# Patient Record
Sex: MALE | Race: BLACK OR AFRICAN AMERICAN | NOT HISPANIC OR LATINO | Employment: FULL TIME
[De-identification: names, ages, dates, MRNs, and addresses within clinical notes are randomized per-mention and may not be internally consistent; named-entity substitution may affect disease eponyms.]

---

## 2020-10-09 ENCOUNTER — TRANSCRIBE ORDERS (OUTPATIENT)
Dept: OTHER | Age: 40
End: 2020-10-09

## 2020-10-09 DIAGNOSIS — G57.93 NEUROPATHIC PAIN OF BOTH FEET: Primary | ICD-10-CM

## 2020-10-19 ENCOUNTER — OFFICE VISIT (OUTPATIENT)
Dept: NEUROLOGY | Facility: CLINIC | Age: 40
End: 2020-10-19
Attending: INTERNAL MEDICINE
Payer: COMMERCIAL

## 2020-10-19 DIAGNOSIS — R20.2 NUMBNESS AND TINGLING OF BOTH LEGS: Primary | ICD-10-CM

## 2020-10-19 DIAGNOSIS — R20.0 NUMBNESS AND TINGLING OF BOTH LEGS: Primary | ICD-10-CM

## 2020-10-19 PROCEDURE — 95885 MUSC TST DONE W/NERV TST LIM: CPT | Performed by: INTERNAL MEDICINE

## 2020-10-19 PROCEDURE — 95910 NRV CNDJ TEST 7-8 STUDIES: CPT | Performed by: INTERNAL MEDICINE

## 2020-10-19 NOTE — LETTER
10/19/2020       RE: Karla Quiñones  [address]  [facility] [address]     Dear Colleague,    Thank you for referring your patient, Karla Quiñones, to the [facility] [place] at [facility]. Please see a copy of my visit note below.        [facility]  Electrodiagnostic Laboratory      Nerve Conduction & EMG Report          Patient:       Karla Quiñones  Patient ID:    [id number]  Gender:        Male  YOB: 1980  Age:           40 Years 8 Months        History & Examination:  Patient is a 41 y/o male who presents with tingling/burning sensation in the lower extremities. Symptoms are symmetric and present below the knees. He denies any symptoms in the hands. He denies any low back pain that radiates down the legs. Examination shows 5/5 strength in the lower extremities and 1+ reflexes.     Techniques: Motor conduction studies were done with surface recording electrodes. Sensory conduction studies were performed with surface electrodes, unless indicated otherwise by (n), designating the use of subdermal recording electrodes. Temperature was monitored and recorded throughout the study. Upper extremities were maintained at a temperature of 32 degrees Centigrade or higher.  Lower extremities were maintained at a temperature of 31degrees Centigrade or higher. EMG was done with a concentric needle electrode.     Results:  Sensory NCS  - Right radial antidromic sensory nerve action potential (SNAP) showed normal peak latency, amplitude, and conduction velocity.  - Bilateral sural SNAPs showed normal peak latency, amplitude, and conduction velocity.  - Right superficial peroneal SNAP showed normal peak latency, amplitude, and conduction velocity.    Motor NCS  - Bilateral deep peroneal - EDB orthodromic compound muscle action potentials (CMAPs) showed normal distal latency, amplitude and conduction velocity.  - Bilateral tibial - AH  CMAPs showed normal distal latency, amplitude and conduction velocity.    EMG  Needle EMG of selected proximal and distal right lower extremity muscles was performed as tabulated below. No abnormal spontaneous activity was observed in the sampled muscles. Motor unit potential morphology and recruitment patterns were normal.     Interpretation:  This is a normal study. In particular, there is no electrophysiologic evidence of a large fiber polyneuropathy. In addition there is no evidence of a lumbar motor radiculopathy.     Of note this study is unable to evaluate for the presence of small fiber polyneuropathy and clinical correlation is advised.     EMG Physician:  Eliazar Beltran MD   of Neurology  [facility]      Sensory NCS      Nerve / Sites Rec. Site Onset Peak Ref. NP Amp Ref. PP Amp Dist Jose Carlos Ref. Temp     ms ms ms  V  V  V cm m/s m/s  C   R RADIAL - Snuff      Forearm Snuff 1.51 2.03  29.4 15.0 45.5 10 66.2 48.0 32.5      Forearm Snuff 1.61 2.03 3.00 27.8  43.9 10 61.9  32.4   R SURAL - Lat Mall      Calf Ankle 3.13 4.01  10.7 8.0 12.6 14 44.8 38.0 31.4   L SURAL - Lat Mall      Calf Ankle 3.49 4.27  10.1 8.0 10.3 14 40.1 38.0 31.1   R SUP PERONEAL      Lat Leg Garcia 2.34 3.39  5.8  7.1 12.5 53.3 38.0 32.5       Motor NCS      Nerve / Sites Rec. Site Lat Ref. Amp Ref. Rel Amp Dist Jose Carlos Ref. Dur. Area Temp.     ms ms mV mV % cm m/s m/s ms %  C   R DEEP PERONEAL - EDB      Ankle EDB 3.96 6.00 12.2 2.5 100 8   5.36 100 31.4      FibHead EDB 10.63  11.9  97.6 34 51.0 38.0 5.68 106 31.4      Pop Fos EDB 13.02  11.3  93 10.5 43.8 38.0 5.73 95 31.2   L DEEP PERONEAL - EDB      Ankle EDB 4.32 6.00 10.4 2.5 100 8   5.05 100 31.4   R TIBIAL - AH      Ankle AH 4.22 6.00 8.1 4.0 100 8   6.98 100 31.5      Pop Fos AH 13.75  7.3  89.7 45 47.2 38.0 8.18 93.5 31.4   L TIBIAL - AH      Ankle AH 4.58 6.00 11.8 4.0 100 8   6.51 100 31.4       EMG Summary Table     Spontaneous Recruitment MUAP    IA  Fib PSW Fasc H.F. Pattern Amp Dur. PPP   R. TIB ANTERIOR N None None None None N N N N   R. GASTROCN (MED) N None None None None N N N N   R. VAST MEDIALIS N None None None None N N N N   R. ILIOPSOAS N None None None None N N N N                              Again, thank you for allowing me to participate in the care of your patient.      Sincerely,    Eliazar Beltran MD

## 2020-10-19 NOTE — PROGRESS NOTES
[facility]  Electrodiagnostic Laboratory      Nerve Conduction & EMG Report          Patient:       Karla Quiñones  Patient ID:    [id number]  Gender:        Male  YOB: 1980  Age:           40 Years 8 Months        History & Examination:  Patient is a 39 y/o male who presents with tingling/burning sensation in the lower extremities. Symptoms are symmetric and present below the knees. He denies any symptoms in the hands. He denies any low back pain that radiates down the legs. Examination shows 5/5 strength in the lower extremities and 1+ reflexes.     Techniques: Motor conduction studies were done with surface recording electrodes. Sensory conduction studies were performed with surface electrodes, unless indicated otherwise by (n), designating the use of subdermal recording electrodes. Temperature was monitored and recorded throughout the study. Upper extremities were maintained at a temperature of 32 degrees Centigrade or higher.  Lower extremities were maintained at a temperature of 31degrees Centigrade or higher. EMG was done with a concentric needle electrode.     Results:  Sensory NCS  - Right radial antidromic sensory nerve action potential (SNAP) showed normal peak latency, amplitude, and conduction velocity.  - Bilateral sural SNAPs showed normal peak latency, amplitude, and conduction velocity.  - Right superficial peroneal SNAP showed normal peak latency, amplitude, and conduction velocity.    Motor NCS  - Bilateral deep peroneal - EDB orthodromic compound muscle action potentials (CMAPs) showed normal distal latency, amplitude and conduction velocity.  - Bilateral tibial - AH CMAPs showed normal distal latency, amplitude and conduction velocity.    EMG  Needle EMG of selected proximal and distal right lower extremity muscles was performed as tabulated below. No abnormal spontaneous activity was observed in the sampled muscles. Motor unit potential morphology and recruitment  patterns were normal.     Interpretation:  This is a normal study. In particular, there is no electrophysiologic evidence of a large fiber polyneuropathy. In addition there is no evidence of a lumbar motor radiculopathy.     Of note this study is unable to evaluate for the presence of small fiber polyneuropathy and clinical correlation is advised.     EMG Physician:  Eliazar Beltran MD   of Neurology  [facility]      Sensory NCS      Nerve / Sites Rec. Site Onset Peak Ref. NP Amp Ref. PP Amp Dist Jose Carlos Ref. Temp     ms ms ms  V  V  V cm m/s m/s  C   R RADIAL - Snuff      Forearm Snuff 1.51 2.03  29.4 15.0 45.5 10 66.2 48.0 32.5      Forearm Snuff 1.61 2.03 3.00 27.8  43.9 10 61.9  32.4   R SURAL - Lat Mall      Calf Ankle 3.13 4.01  10.7 8.0 12.6 14 44.8 38.0 31.4   L SURAL - Lat Mall      Calf Ankle 3.49 4.27  10.1 8.0 10.3 14 40.1 38.0 31.1   R SUP PERONEAL      Lat Leg Garcia 2.34 3.39  5.8  7.1 12.5 53.3 38.0 32.5       Motor NCS      Nerve / Sites Rec. Site Lat Ref. Amp Ref. Rel Amp Dist Jose Carlos Ref. Dur. Area Temp.     ms ms mV mV % cm m/s m/s ms %  C   R DEEP PERONEAL - EDB      Ankle EDB 3.96 6.00 12.2 2.5 100 8   5.36 100 31.4      FibHead EDB 10.63  11.9  97.6 34 51.0 38.0 5.68 106 31.4      Pop Fos EDB 13.02  11.3  93 10.5 43.8 38.0 5.73 95 31.2   L DEEP PERONEAL - EDB      Ankle EDB 4.32 6.00 10.4 2.5 100 8   5.05 100 31.4   R TIBIAL - AH      Ankle AH 4.22 6.00 8.1 4.0 100 8   6.98 100 31.5      Pop Fos AH 13.75  7.3  89.7 45 47.2 38.0 8.18 93.5 31.4   L TIBIAL - AH      Ankle AH 4.58 6.00 11.8 4.0 100 8   6.51 100 31.4       EMG Summary Table     Spontaneous Recruitment MUAP    IA Fib PSW Fasc H.F. Pattern Amp Dur. PPP   R. TIB ANTERIOR N None None None None N N N N   R. GASTROCN (MED) N None None None None N N N N   R. VAST MEDIALIS N None None None None N N N N   R. ILIOPSOAS N None None None None N N N N

## 2020-10-19 NOTE — LETTER
10/19/2020       RE: Karla Quiñones  [address]  [facility] [address]     Dear Colleague,    Thank you for referring your patient, Karla Quiñones, to the [facility] [place] at [facility]. Please see a copy of my visit note below.    Nerve Conduction & EMG Report      Patient:       Karla Quiñones  Patient ID:    [id number]  Gender:        Male  YOB: 1980  Age:           40 Years 8 Months        History & Examination:  Patient is a 41 y/o male who presents with tingling/burning sensation in the lower extremities. Symptoms are symmetric and present below the knees. He denies any symptoms in the hands. He denies any low back pain that radiates down the legs. Examination shows 5/5 strength in the lower extremities and 1+ reflexes.     Techniques: Motor conduction studies were done with surface recording electrodes. Sensory conduction studies were performed with surface electrodes, unless indicated otherwise by (n), designating the use of subdermal recording electrodes. Temperature was monitored and recorded throughout the study. Upper extremities were maintained at a temperature of 32 degrees Centigrade or higher.  Lower extremities were maintained at a temperature of 31degrees Centigrade or higher. EMG was done with a concentric needle electrode.     Results:  Sensory NCS  - Right radial antidromic sensory nerve action potential (SNAP) showed normal peak latency, amplitude, and conduction velocity.  - Bilateral sural SNAPs showed normal peak latency, amplitude, and conduction velocity.  - Right superficial peroneal SNAP showed normal peak latency, amplitude, and conduction velocity.    Motor NCS  - Bilateral deep peroneal - EDB orthodromic compound muscle action potentials (CMAPs) showed normal distal latency, amplitude and conduction velocity.  - Bilateral tibial - AH CMAPs showed normal distal latency, amplitude and conduction  velocity.    EMG  Needle EMG of selected proximal and distal right lower extremity muscles was performed as tabulated below. No abnormal spontaneous activity was observed in the sampled muscles. Motor unit potential morphology and recruitment patterns were normal.     Interpretation:  This is a normal study. In particular, there is no electrophysiologic evidence of a large fiber polyneuropathy. In addition there is no evidence of a lumbar motor radiculopathy.     Of note this study is unable to evaluate for the presence of small fiber polyneuropathy and clinical correlation is advised.     EMG Physician:  Eliazar Beltran MD   of Neurology  [facility]      Sensory NCS      Nerve / Sites Rec. Site Onset Peak Ref. NP Amp Ref. PP Amp Dist Jose Carlos Ref. Temp     ms ms ms  V  V  V cm m/s m/s  C   R RADIAL - Snuff      Forearm Snuff 1.51 2.03  29.4 15.0 45.5 10 66.2 48.0 32.5      Forearm Snuff 1.61 2.03 3.00 27.8  43.9 10 61.9  32.4   R SURAL - Lat Mall      Calf Ankle 3.13 4.01  10.7 8.0 12.6 14 44.8 38.0 31.4   L SURAL - Lat Mall      Calf Ankle 3.49 4.27  10.1 8.0 10.3 14 40.1 38.0 31.1   R SUP PERONEAL      Lat Leg Garcia 2.34 3.39  5.8  7.1 12.5 53.3 38.0 32.5       Motor NCS      Nerve / Sites Rec. Site Lat Ref. Amp Ref. Rel Amp Dist Jose Carlos Ref. Dur. Area Temp.     ms ms mV mV % cm m/s m/s ms %  C   R DEEP PERONEAL - EDB      Ankle EDB 3.96 6.00 12.2 2.5 100 8   5.36 100 31.4      FibHead EDB 10.63  11.9  97.6 34 51.0 38.0 5.68 106 31.4      Pop Fos EDB 13.02  11.3  93 10.5 43.8 38.0 5.73 95 31.2   L DEEP PERONEAL - EDB      Ankle EDB 4.32 6.00 10.4 2.5 100 8   5.05 100 31.4   R TIBIAL - AH      Ankle AH 4.22 6.00 8.1 4.0 100 8   6.98 100 31.5      Pop Fos AH 13.75  7.3  89.7 45 47.2 38.0 8.18 93.5 31.4   L TIBIAL - AH      Ankle AH 4.58 6.00 11.8 4.0 100 8   6.51 100 31.4       EMG Summary Table     Spontaneous Recruitment MUAP    IA Fib PSW Fasc H.F. Pattern Amp Dur. PPP   R. TIB ANTERIOR N  None None None None N N N N   R. GASTROCN (MED) N None None None None N N N N   R. VAST MEDIALIS N None None None None N N N N   R. ILIOPSOAS N None None None None N N N N                        Again, thank you for allowing me to participate in the care of your patient.  Sincerely,    Eliazar Beltran MD

## 2020-11-02 ENCOUNTER — COMMUNICATION - HEALTHEAST (OUTPATIENT)
Dept: SURGERY | Facility: CLINIC | Age: 40
End: 2020-11-02

## 2020-11-04 ENCOUNTER — COMMUNICATION - HEALTHEAST (OUTPATIENT)
Dept: SURGERY | Facility: CLINIC | Age: 40
End: 2020-11-04

## 2020-11-04 ENCOUNTER — OFFICE VISIT - HEALTHEAST (OUTPATIENT)
Dept: SURGERY | Facility: CLINIC | Age: 40
End: 2020-11-04

## 2020-11-04 DIAGNOSIS — E66.01 OBESITY, CLASS III, BMI 40-49.9 (MORBID OBESITY) (H): ICD-10-CM

## 2020-11-04 DIAGNOSIS — R73.03 PREDIABETES: ICD-10-CM

## 2020-11-04 RX ORDER — LIRAGLUTIDE 6 MG/ML
INJECTION SUBCUTANEOUS
Qty: 3 ML | Refills: 3 | Status: SHIPPED | OUTPATIENT
Start: 2020-11-04 | End: 2022-04-13

## 2020-11-04 RX ORDER — GABAPENTIN 600 MG/1
1200 TABLET ORAL
Status: SHIPPED | COMMUNITY
Start: 2020-10-29 | End: 2022-04-13

## 2020-11-04 RX ORDER — METFORMIN HCL 500 MG
500 TABLET, EXTENDED RELEASE 24 HR ORAL
Status: ON HOLD | COMMUNITY
Start: 2020-10-29 | End: 2022-04-15

## 2020-11-05 ENCOUNTER — COMMUNICATION - HEALTHEAST (OUTPATIENT)
Dept: SURGERY | Facility: CLINIC | Age: 40
End: 2020-11-05

## 2020-11-09 ENCOUNTER — COMMUNICATION - HEALTHEAST (OUTPATIENT)
Dept: SURGERY | Facility: CLINIC | Age: 40
End: 2020-11-09

## 2020-11-11 ENCOUNTER — OFFICE VISIT - HEALTHEAST (OUTPATIENT)
Dept: SURGERY | Facility: CLINIC | Age: 40
End: 2020-11-11

## 2020-11-11 DIAGNOSIS — E66.01 MORBID OBESITY WITH BMI OF 40.0-44.9, ADULT (H): ICD-10-CM

## 2020-11-11 ASSESSMENT — MIFFLIN-ST. JEOR: SCORE: 2495.64

## 2020-11-18 ENCOUNTER — COMMUNICATION - HEALTHEAST (OUTPATIENT)
Dept: SURGERY | Facility: CLINIC | Age: 40
End: 2020-11-18

## 2021-01-06 ENCOUNTER — AMBULATORY - HEALTHEAST (OUTPATIENT)
Dept: SURGERY | Facility: CLINIC | Age: 41
End: 2021-01-06

## 2021-01-06 ENCOUNTER — COMMUNICATION - HEALTHEAST (OUTPATIENT)
Dept: SURGERY | Facility: CLINIC | Age: 41
End: 2021-01-06

## 2021-01-06 DIAGNOSIS — E66.01 OBESITY, CLASS III, BMI 40-49.9 (MORBID OBESITY) (H): ICD-10-CM

## 2021-01-06 RX ORDER — PHENTERMINE HYDROCHLORIDE 37.5 MG/1
TABLET ORAL
Qty: 90 TABLET | Refills: 0 | Status: SHIPPED | OUTPATIENT
Start: 2021-01-06 | End: 2022-04-13

## 2021-01-27 ENCOUNTER — COMMUNICATION - HEALTHEAST (OUTPATIENT)
Dept: SURGERY | Facility: CLINIC | Age: 41
End: 2021-01-27

## 2021-06-05 VITALS — BODY MASS INDEX: 39.17 KG/M2 | HEIGHT: 75 IN | WEIGHT: 315 LBS

## 2021-06-12 NOTE — TELEPHONE ENCOUNTER
Prior Authorization Request  Who s requesting:  Patient and Pharmacy  Pharmacy Name and Location: [facility] in [place]  Medication Name: Victoza pen   Insurance Plan: Medicaid  Insurance Member ID Number:  [id number]  CoverMyMeds Key: [id number]  Informed patient that prior authorizations can take up to 10 business days for response:   Yes  Okay to leave a detailed message: Yes    Letter in support for PA of [place]:  Karla Quiñones is a 40 year old man with Class III morbid obesity, complicated by Metabolic Syndrome and signs of fatty liver disease. He's initiating care in a comprehensive, non surgical, bariatric care clinic and we see indication for Liraglutide approval given his insulin resistance/metabolic syndrome and morbid obesity. We'll look for at least a 5% weight reduction over the next 12-16 weeks to determine efficacy and recheck A1c/LFTs/triglycerides for signs of improvement of all dietary/behavioral/exercise and medication interventions.    Kind Regards,  Brian Mina MD  [facility]

## 2021-06-12 NOTE — PATIENT INSTRUCTIONS - HE
"Plan:  1. Welcome to your weight loss season, to start aim for getting 3 meals a day about every 5 hours and each meal, to start should have about 35 grams of lean protein per meal, about 100g/day goal to start.  See below.    2. Start fitness program, consider HIIT after a couple weeks of gentle walking/exercise. Look to add in at least 2 days of some strength work.     3. For appetite suppression, continue metformin, we'll try to get Victoza approved given your excess weight and prediabetes and to curb appetite further, start a trial of phentermine:  Use half a tablet about 2-3 hours after breakfast but no closer than 12 hours before bedtime.   Don't use phentermine if too stimulated, if anxious, if sick or using any cold medications/decongestants as the combination can cause rapid heart rate and blood pressure elevation. Recheck efficacy and tolerance in about 4-6 weeks.     4. Follow up with dietician as planned. Pay attention to any hunger/success tendencies.  Food journaling helps.     5. Labs reviewed from July, recommend recheck CMP, vitamin d and PTH levels and thiamine given some of the foot issues.    6. Gabapentin can make it easier to overeat. If not getting much benefit from the medication, consider tapering off with your regular doctor (typically a 2-4 week process).       What makes a person succeed with dramatic and sustained weight loss?    It's being at the right point in your life where you feel the need to lose the weight, not because anyone told you so but because of a voice inside of you that says, \"I am ready for this\".  You're now at a point where you may be feeling anxious, irritable and when you look in the mirror you do not recognize the person looking back.  Your healthy self is buried somewhere in that reflexion and you want to free it again.  This is the sort of motivation that leads to success, and it comes from you.    Because the only person that can lose that extra weight is you, I " "like my patients to focus on the mindset of being in Weight Loss Season.  This gives you permission to make the changes necessary to be consistent with the diet/activity and behavior changes that lead to successful, healthy weight loss.  Nearly any diet plan can work for weight loss, but keeping it healthy and nutrient based to prevent deficiencies/hair loss/fatigue or irritability is vital.  If you have a plan you want to try, we'll work with you to make sure no adjustments are needed to keep you healthy through your weight loss season and working with our Bariatric Dieticians you'll be given expert guidance to customize your diet plan to suit your particular needs. If you don't have your own ideas in mind, we are always happy to suggest well researched and validated plans that provide enough food to prevent hunger but still tap into your excess fat reserves and lose weight in a sustainable fashion.  There is great evidence that lean protein/healthy fat intake with good fiber intake while minimizing simple starches/carbs produces reliable/sustainable weight loss in most people. But some feel more connected to an intermittent fasting/fast mimicking or ketogenic diet.  These protocols can be hard for many to stick with and that's why we prefer the protein/healthy fat focused diets but if these alternative strategies appeal to you, we can work with you to optimize your knowledge and results with these tools.    Losing weight is a temporary commitment, but you need to be \"All In\" to have a good weight loss season.  To avoid frustration, you have to be willing to be on track 19 out of 20 days or even better than that. But, weight loss season is generally only 4-8 months in length. After that length of time, it can be hard to maintain a negative calorie balance and our brain, motivations and metabolism will usually bring you to a plateau that cannot be broken in this modern world where other commitments start to take " "priority. That's when we look to stabilize the weight loss you've achieved.  If you've reached your goal by that time, fantastic, and job well done.  If there is more to go, then after a few months of stabilization, we can usually attack that previous plateau and break into new territory.    Because of this time limitation, we want to really get to work right away and get into a sustainable routine ASAP.  One of the best predictors of how much weight you're going to lose throughout the season is how much you lose in the first 6 weeks, so prepare well and jump in with both feet.      Occasionally, people may feel like they cannot commit fully to the changes necessary and may want to change one thing at a time and \"get used to\" the idea of losing weight.  That is OK because that is where they are in their life, and they cannot fully commit for any number of reasons.  It's part of that internal motivation and they just haven't reached PRIORTY NUMBER ONE status yet. It's possible that what they need is more time to reach that point and I am always willing to work with people that want to \"dabble\", but understand, the amount of success obtained with half measures, is much less than half results. Behavior Change cannot occur until we prepare our minds, bodies and environment for what is to come, action!    As you go through your plan, look for things to keep your motivation rolling.  The most successful people have a goal or target/reward that they are working towards.  Having a reward that celebrates your new fitness, mobility and energy is the best sort because it will encourage you to do well with the weight maintenance phase and long term lifestyle changes that promotes keeping the weight off for the long term.  Usually, \"getting healthier\" or improving blood tests or losing weight so your clothes fit better is not as internally motivating as having a tangible reward.  A good weight loss season reward is one that " isn't food based, is affordable, but something special:  Something you won't be getting/doing unless you achieve your goal.   It s important to keep to the rule of success:  in order to get the reward, your goal MUST be achieved. Write this reward down, where you can look at it daily and keep it in the front of your mind as you go through your weight loss season and it will help keep you on track.    Tools that help change behavior are vital for success. The most studied and most supported tool for weight loss is nothing more than writing down your food and weight every day.  Every Day.  Accurately and completely.  When you commit to weight loss season, this information tells you whether you're getting ENOUGH food to fuel your weight loss properly as well as teaches you the interaction between different foods you eat and how your body responds with weight loss.  You'll see that sometimes after a heavy workout you don't see the scale move until 2-3 days later.  How saltier meals (chili for example) may make you retain water for 4-5 days before you see the weight come off, you'll get used to the mini-plateaus that develop after a good 3-8 lb drop in weight as well as how you break through if you keep working the diet as you should.    Weight loss is not a linear process, there are mini ups and downs.  Learning how your body loses weight and getting comfortable with that is very rewarding. The act of writing words on paper also solidifies your will power and commitment to the season of weight loss and that by itself changes your brain chemistry/appetite, motivation and prepares you for maximal success.     Behavior change is all about getting into a new routine.  The old habits and routine have to change because without changing the circumstances of how you gained your weight, it's unlikely you'll enjoy satisfying results. If you have snacking habits, like every time you walk through the kitchen you grab a little  "something, well, that habit has to change and be replaced by a new habit.  It can be something as simple as keeping a doodle pad on the counter that you make a few scribbles and then walk through the kitchen having not opened the cupboard, or starting with a glass of water and leaving the kitchen without anything else, or checking your food journal to see how many calories you have left for the day.  Boredom is the enemy as are the old habits. Break new ground and try to push those old habits into a deep hole.  There are apps/counseling options available that can help with some of the day to day urges/behaviors if you're struggling. One commercial product that does a good job is [employer].  Unfortunately, there is a subscription fee.    Finally, exercise always helps.  While not mandatory to lose weight, every little bit helps and exercise has so many other benefits that to not work it into your plan is to miss out on all the mood, sleep, stress and general health benefits that come from making yourself a little short of breath and sweaty at least 3-4 days out of the week.  The metabolism and calorie burn benefits aside, almost every chronic ailment in medicine gets better with proper, aerobic exercise.  Allow yourself to start slow and let your body prepare itself to accept harder training 4-6 weeks down the road, but start now and commit to a plan.  Whether you have the means to hire a , join a gym or just walk out your front door or go down to your basement for a video workout, get into a exercise routine and  after 3 weeks of at least 3 times a week exercise you should be at a point where you can slowly start ramping up 10% each week to our goal of at least 150-300minutes weekly of aerobic exercise and at least twice weekly resistance training/strenghtening with weights/bands or body weight exercises.     I am a big fan of modifying the free training plan, \"Couch to 5k\", for almost all of my patients. Just " "type it into [employer] or look it up on your smart phone arin store.  To modify the plan,  you can use the training plan for whatever aerobic activity you do (bike/treadmill/elliptical/rower/pool/etc). During the \"jog\" intervals, you just move a little faster or harder or increase the tension or incline.  You use those little intervals to switch up the workout and recruit more muscles and pump the blood a little more and then recover again in the \"walk\" intervals by slowing down, decreasing the incline or turning down the tension.  3-4 days a week is not that much to ask and the benefits are enormous.  Start slow and develop the base from which you can then build on and reduce the risk of injury.  It's much more important 2-4 months from now to be enjoying your exercise then it is to over exert yourself at the start and hurt yourself.  Starting slowly allows your body to accept the training better down the road when the exercise becomes crucial for weight maintenance.  Without exercise down the road during your maintenance phase, all this hard work you are about to put in can be undone. It usually takes about 100-300 calories a day of exercise to maintain a weight loss and our focus during weight loss season is to generate the routine/activities and hobbies that make that enjoyable/sustainable.    Thanks for taking this first and most important step in your weight loss season.  Commit to it and we will cheerlead you all the way to success.  When things get tough or off track we'll offer guidance and analysis and when you reach your goal we'll celebrate your success.  In the end, it is all about your success, your health and what you do with it.      Brian Mina MD  [facility]  [phone]        LEAN PROTEIN SOURCES  Getting 20-30 grams of protein, 3 meals daily, is appropriate for most people, some need more but more than about 40 grams per meal is not useful.  General rule is " drinking one ounce of water per gram of protein eaten over the course of the day:  70 grams of protein each day, drink 70 oz of water.  Protein Source Portion Calories Grams of Protein                           Nonfat, plain Greek yogurt    (10 grams sugar or less) 3/4 cup (6 oz)  12-17   Light Yogurt (10 grams sugar or less) 3/4 cup (6 oz)  6-8   Protein Shake 1 shake 110-180 15-30   Skim/1% Milk or lactose-free milk 1 cup ( 8 oz)  8   Plain or light, flavored soymilk 1 cup  7-8   Plain or light, hemp milk 1 cup 110 6   Fat Free or 1% Cottage Cheese 1/2 cup 90 15   Part skim ricotta cheese 1/2 cup 100 14   Part skim or reduced fat cheese slices 1 ounce 65-80 8     Mozzarella String Cheese 1 80 8   Canned tuna, chicken, crab or salmon  (canned in water)  1/2 cup 100 15-20   White fish (broiled, grilled, baked) 3 ounces 100 21   [place]/Tuna (broiled, grilled, baked) 3 ounces 150-180 21   Shrimp, Scallops, Lobster, Crab 3 ounces 100 21   Pork loin, Pork Tenderloin 3 ounces 150 21   Boneless, skinless chicken /turkey breast                          (broiled, grilled, baked) 3 ounces 120 21   [place], [place], [place], and Venison 3 ounces 120 21   Lean cuts of red meat and pork (sirloin,   round, tenderloin, flank, ground 93%-96%) 3 ounces 170 21   Lean or Extra Lean Ground Turkey 1/2 cup 150 20   90-95% Lean [place] Burger 1 sarath 140-180 21   Low-fat casserole with lean meat 3/4 cup 200 17   Luncheon Meats                                                        (turkey, lean ham, roast beef, chicken) 3 ounces 100 21   Egg (boiled, poached, scrambled) 1 Egg 60 7   Egg Substitute 1/2 cup 70 10   Nuts (limit to 1 serving per day)  3 Tbsp. 150 7   [place] (peanut, almond)  Limit to 1 serving or less daily 1 Tbsp. 90 4   Soy Burger (varies) 1  15   Garbanzo, Black, Maki Beans 1/2 cup 110 7   Refried Beans 1/2 cup 100 7   Kidney and Lima beans 1/2 cup 110 7   Tempeh 3 oz 175 18   Vegan crumbles  "1/2 cup 100 14   Tofu 1/2 cup 110 14   Chili (beans and extra lean beef or turkey) 1 cup 200 23   Lentil Stew/Soup 1 cup 150 12   Black Bean Soup 1 cup 175 12             High Intensity Interval Training (HIIT):    To feel our best, our bodies need to move. Our mental health, sleep, memory, immune function, stress coping and metabolic health depend on exercise and its benefits for optimizing how our body works best. In the modern world, most do not get nearly enough exercise.  However, it's important to understand that ANYTHING is better than nothing and if you can get started with a routine for fitness, even a little bit has some benefit compared to none.  The more you do, the better (up to 20 hours weekly, beyond that the benefit tails off), but the NIH guidelines for all adults in [place] is to work up to 150-200 minutes of moderate aerobic activity each week to improve our health.  If you're a person that struggles with time pressure/lack of interest then those 2.5-3 hours weekly may be too much to ask.  So, we have to be very efficient in how we exercise to reap the benefits. It turns out that INTENSITY matters. When we use Vigorous rather than Moderate aerobic activity (Vigorous defined by a heart rate of 70-85% of calculated maximum heart rate; max heart rate equals 220 beats minus your age or the level of effort where you could talk 2-4 words before needing to take a breath), the amount of time required to reap the benefits of exercise is cut in half:  75-90 minutes/week.      A recent study showed that a 10 minute interval workout using a 3-4 minute warm up and then 20 second \"maximum effort\" followed by 1 minute, 40 seconds of recovery and then repeated two more times was as effective in improving fitness as a 30 minute brisk walk.  They utilized exercise bikes or stairs for the effort but you could adapt to whatever geography/gym/pool/bike/hill/staircase that you may have as long as you can safely do the " effort without injury.   As your fitness improves, intervals of 30 seconds to 2 minutes of increased effort followed by 1-2 minutes of recovery are options as well. The fitter you become, the harder and more intervals you'll be able to do.  Start with what you CAN do, not what you WANT to do and that will allow your body to adapt/develop fitness down the road to reach your goals.  Give yourself permission to develop a base of fitness the first 3 weeks and then you should be able to ramp up from there nicely and progressively each week.    Jumping right into a High Intensity Interval workout if you've not been having some level of exercise/fitness recently can increase your risk of injury.  To help develop some base fitness here are some options that would give you a 3-4 week base development, assuming you can walk or ride a stationary bike but haven't been doing much the last few months. 3-4 sessions each week of:      Week Warm up Interval: 3-6 repeats Cooldown   1 3-5 minutes easy walk/spin 20 seconds brisk but doable pace then recover with 90 seconds easy 2-3 minute easy walk/spin   2 3-5 minutes easy walk/spin 25 seconds brisk, 90 seconds recovery 2-3 minute easy walk/spin   3 3-5 minutes easy walk/spin 30 seconds brisk, 90 seconds recovery 2-3 minute easy walk/spin   4 3-5 minutes easy walk/spin 40 seconds brisk, 60 seconds recovery 2-3 minute easy walk/spin     *for base development, keep resistance steady and just  the speed. Once you've completed base phase, feel free to add a little extra resistance to the faster phase to increase vigorousness/heart rate.  During base phase you should be still able to talk comfortable/sing during faster pedaling/walking.     After completing the base phase, start ramping up workouts on the bike/walking. Have one easy pace workout but of longer duration (add 2-3 minutes each week to the time) each week.  One workout weekly should have an interval workout where you  push your intensity working up to those 15-30 second maximum efforts and recovering fully and then repeating for at least 3-4 intervals.  Cool down/easy pedal at the end for 1-2 minutes.     Consider a spin class if you have the means/access and remember, it's OK to rest if needed. Make the workout yours and don't focus on other people's abilities, getting started will develop your own fitness every week.  Jogging plans such as the Couch to 10k or any aerobic training program make use of similar intervals and can help you reach a fitter and more capable body regardless of where/how you perform the intervals (walking/biking/swimming/elliptical/row machine/erg arm machine, peddler, raking, lawn mowing,etc).  Go for it.          Exercise Guidance    Nearly everything that bothers us gets better when the proper amount of exercise can be done in the proper amounts.  Getting to that level safely and without injury is the key.  When it comes to weight loss, exercise is especially important in maintaining the weight loss.  Unfortunately, one of the harsh realities is that substantial weight loss slows our metabolism, often anywhere from 5-20%.    Our brain always remembers our heaviest weight and we can return to that if we're not mindful and moving regularly.  Our biology doesn't understand the concept of having too much energy, only not having enough.  As such, when we lose weight, it's thought that the brain interprets this as we're ill or in a famine and dials back our metabolism to limit further weight loss.  This is why exercise is so important in keeping the weight off and is the main reason people have some weight regain from their low weight point after weight loss.  We have to make up that 10-20% of calories not being burned.Since we can restrict our intake for only so long, exercise becomes very important in our long term healthy weigh maintenance to balance out the occasional indiscretion with our  diet.    Generally, for every 5% body weight reduction in a weight loss season, a person needs to add  kilocalories of exercise in their daily routine to keep that weight off for the long term.  This is why it's vital to be starting your fitness regimen during weight loss season, so that routine is well established as you move into your maintenance period.    Additionally, all sorts of good enzymes and genes turn on with exercise and our stress, sleep, mood and bodies feel better when we can get to the point of making ourselves a little sweaty and short of breath 35-50 minutes most days of the week. But we have to start with what we can do first and give ourselves permission to work our way up to this goal.    Who isn't ready for exercise? Well, if you get severe dizziness/palpitations, chest pain or short of breath/faint with even minimal activity like walking across a room or you're having to pause while going up a flight of stairs, then getting your heart and/or lungs fully evaluated prior to starting an exercise regimen is recommended. Everyone else can probably start a program, but everyone may start at a different point:  Some can set a 5-10 minute walking goal and others will be able to ride their bike for an hour.      Start with where you're at and look to add 10% more each week until you're at that 150 minutes or more a week (or 75 minutes/week or more of vigorous exercise). Moderate exercise can be estimated as the pace you can carry on a conversation and vigorous is the pace at which you can get 3-5 words out before having to take a breath.  If you're using heart rate monitoring, Moderate is about 60% of your maximum heart rate and vigorous about 75%. (Max heart rate estimated as 220 beats minus your age:  Example: 220-age of 42 =176 Beats per minute (BPM) maximum. 0.6X 176= 105 BPM (moderate), 132 BMP(vigorous)).    If you like to count steps, the 10,000 steps per day does correlate well with  "weight maintenance but try to make at least 20-25% of those steps at a brisk pace (like you are about to miss your bus).    Finally, if you are pressed for time, it's important to know that some exercise is better than none.  High Intensity Interval training (HIIT) is a good way to get as much out of a short period of working out. If you can't walk, use the stairs, bike or swim; you could use a punching/arm workout regimen for your activity.  The idea with HIIT is to have a 3-6 minute warm up period of low intensity and the 3-6 \"intervals\" where you push the intensity up and then recover and start the next interval. One study showed that 3 intervals of 20 seconds at \"Maximum Effort\" while either biking on a stationary bike or going up stairs and then having 100 seconds recovery time before the next Maximum Effort was equally as beneficial on cardiovascular fitness development as doing 30 minutes of moderately paced walking 3 days weekly over a 6 week period of time.  So intensity matters. You just need to be able to safely do your desired exercise without injury. There are many great HIIT exercises/routines out there. IF you're not doing much exercise currently, I recommend giving your self 2-3 weeks of moderate exercise, 3 days weekly minimum to get your bones/tendons/muscles used to exercise before going for High Intensity workouts.    If you like to use Apps on the phone, the couch to 5k arin and 7 minute workout apps are nice places to start if you are reasonably healthy.  There are hundreds of other options out there.  Consider viewing [employer] if gentler exercise/movement is desired. Videos on Stephen Chi and chair yoga for seniors exist and are free. Check them out and let's get that 3-4 days a week routine going.    Let's move!  Brian Mina MD.     Basic Smoothie:    Start with a good, unsweetened protein powder (BiPro, TerasWhey, or your favorite) 2 scoops is usually at least 20 grams of protein. (goal of 20-30 " grams), READ THE LABEL.    Drizzle (1 tablespoon) of olive oil (120 morenita) and/or 1/2 an avocado (remove pit:175 morenita) for good fat/satiation.  Adjust these to fit into your particular calorie needs.    Half a banana (70 morenita )    Handful of frozen blueberries/raspberries/peach slices (or all) (30-40 morenita)    Small,  thumbnail sized piece of clary root (cut off skin) (10-20 morenita)    3 leafs or more of Kale (strip away from stem and use just the leaves).  May use fresh or frozen.  Can also add spinach/parsley to mix it up.  (25 morenita)    One stalk of celery torn in 2-3 pieces. Add more if desired. (5 calories)    1/3-1/2 of a cucumber cut into small sections. (15calories)    1 inch section of Red/Green/Yellow Bell pepper without the seeds. (10 calories)    Add 6 oz of water to allow for a drinkable consistency. More if needed.    Blend until smooth.  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    Rinse  right away to avoid stuck on mess.      Play around with different combinations or your personal favorites.  Try to get a bit of good fat, protein powder, fruits and veggies all in one smoothie.  Watch portion sizes   The above recipe is roughly 450-500 calories depending on how much actually goes into smoothie. This would be a good meal replacement.  If trying to lose weight with a smoothie type diet, I recommend a complete multi-vitamin to make sure all nutrients are supported. Having 2 smoothies a day and one balance evening meal such as fish/stir fried vegetables/mushrooms and a glass of water will generally keep daily calorie content between 6809-3396 calories a day.  [place] women or those with a less than 1300 calorie daily target may need to pay closer attention to portion sizes to achieve their target daily caloric intake for sustainable/durable weight loss.          Sprouting Nuts and Seeds:  Almonds, walnuts, cashews, pistachios and brazil nuts can be a very healthy,  "satisfying snack that contains good oils and fats that improve satiation.    Sprouting is the process where a slight germination occurs by soaking the nuts or seeds overnight in a slight salt water bath.  This removes some of the enzyme inhibitors/anti-nutrients that can be found in the outer layers of many nuts and makes them more nutritionally valuable and improves absorption.    To prevent going rancid, buy only enough for a 2-3 week supply (a couple cups worth).      To Sprout Nuts or Seeds  Buy organic nuts/seeds.  Preferably a Non-GMO (genetically modified organism) seed/nut.  They should be \"Raw\" not roasted/salted or candied.  Frequently nuts will be roasted with vegetable oils that are not healthy for you or go rancid very quickly after the roasting, turning a healthy snack into something unhealthy.    Place 2-3 cups of nuts/seeds in a deep dish or glass casserole dish.   Fill dish with enough water to cover the seeds/nuts by at least an inch.  Add 1 tablespoon of salt per 4 cups of nuts/seeds.  Cover with towel and soak for 12-24 hours.    After 12-24 hours, rinse nuts/seeds with fresh water.  Place on cookie sheet or baking tray and place in the oven at the lowest possible temperature 120-160 degrees is ideal.  The lower the better.  Dry nuts and seeds for 8-24 hours or until fully dry/crisp.  Store in air tight container (pyrex with lid, etc).    Enjoy a small handful, about 1 oz, once or twice daily (usually about 120-180 calories per handful) for a good snack.          Information about the Weight Loss Medication Phentermine    When combined with a commitment to diet and behavior changes, weight loss medications can be a nice additional tool to maximize your weight loss season.  There are no magic pills and without diet and behavior changes, weight loss will be minimal.  Think of this medication as a tool to make your diet and behavior changes easier and you'll enjoy a higher probability of success.  " "Remember not to skip meals, but use this medication to tolerate your reduced calories more easily.  If you are very hungry in the evenings, you are likely not eating enough in the first 10 hours of your day and need to focus on getting your protein requirements in at each of your 3 daily meals.      Phentermine is a stimulant medication related to the amphetamine class of medication but with a lower risk of dependence and addiction.  It is used for weight loss by suppressing the appetite region of the brain.  It also may speed up the metabolic rate and give a person more energy.  Like any medication there are potential side effects and the most common are:  Dry mouth occurs in almost everyone (hydrate well), fewer people experience Palpatations, fast heart rate, elevation of blood pressure, restlessness, insomnia, dizziness, change in mood, tremor, headache, changes in bowel movements,itchiness, changes in sex drive.    Some people can develop serious side effects which include:  Heart strain (\"ischemia\").  Tachycardia (fast heart rate or irregular heart rate).  Hypertension  Pulmonary Hypertension  Psychosis  Dependency and abuse has occurred in some.  If you've been on high dose (37.5mg) for long periods, phentermine should be tapered down over a few weeks before abruptly stopping as seizures have been reported rarely.    We do not recommend taking it in combination with the following medications due to potential drug interactions which can increase the risk of side effects and/or potential for seizures:    Absolutely contraindicated are:  Amphetamines or other stimulants like ADHD medication: (dextroamphetamine, amphetamine, diethylpropion, isocarboxazid, methamphetamine, lisdexamfetamine, benzphetamine,dexmethylphenidate, methylphenidate, selegiline patch, sibutramine, tranylcypromine.    Avoid use with:   Dopamine, dobutamine, ephedra, ephedrine, epinephrine, isoproterenol, linezolid, norepinephrine, " phenylephrine injection, venlafaxine (Effexor).    Monitor or modify dose with:  Acebutolol, atenolol, betaxolol, bisoprolol, carvedilol, droxidopa, esmolol, labetalol, magnesium citrate, metoprolol, nadolol, nebivolol, penbutolol, pindolol, propranolol, sotalol, timolol.    Caution with: armodafinil,betaxolol eye drops, brexpiprazole, bupropion, busulfan, caffeine, carteolol drops, enzalutaminde, ginseng, green tea, guarana, levobunolol drops, lindane cream, modafinil, afrin nasal spray (oxymetazoline), pamabrom, phenylephrine oral and nasal spray, pseudoephedrine (sudafed), rasgiline, sleegiline, bowel prep, tiagabine, timolol drops,  TRAMADOL due to increased risk of seizures.    The current cheapest place to fill your prescription is at [facility], [facility] or [facility] and is around $30 for 90 tablets.  Occasionally, [facility] and [facility] have price matched, so call around and get the best price for you.  Other pharmacies may charge closer to$70- $100 for the same prescription. You don't have to be a member to use the pharmacy at [facility] currently.  An alternative some patients have tried is using a voucher system through [employer].  $25 paid on their website gets you a  voucher that can allows you to pick your meds up at [facility] without paying anything more.    Dosing:  We start with half a tablet for the first 2 weeks and if tolerating it without problems, you can take up to one full tablet daily in the morning after breakfast.  For those with evening hunger problems, sometimes half a tablet in the morning and half a tablet around 1-2 pm can be effective, however, risks of nighttime insomnia/restless increase with afternoon dosing so call me at the clinic if considering this regimen or having any issues.  You only have to use the amount effective for you, not to exceed one full tablet.  It can also be used situationaly and does not have to be taken every day. As always, if any questions give us a call at the [facility] telephone:  [phone].      MEDICATIONS FOR WEIGHT LOSS  There are several medications available to assist us in weight loss.  By themselves, without compliance to a change in diet and increase in movement/activity these medications are disappointing in their results. However, combined with a closely monitored program of diet change and exercise they can be very effective in controlling appetite and boosting initial weight loss.  All weight loss medications need continual re-evaluation for efficacy as their side effects and health benefits fail to be worthwhile if a person is not continuing to lose weight or in maintaining their healthy weight.  Some weight loss medications are scheduled drugs, meaning there is at least a theoretical possibility for developing addiction to them but in practice this is rare.  We do anticipate coming off meds in the future after stabilization of weight loss is assurred.  Finally, a tolerance can develop and people s perceived efficacy of medication can diminish.  In communication with your physician, it may be appropriate to intermittently take a break from these medications and then restart again (few weeks off then restart again) if a plateau is reached that cannot be broken through.  Each person can respond to a medication differently and to be a good option for you, it will need to be affordable, effective and well tolerated with minimal side effects.    In most cases, weight loss progress after one month and three months will be obtained and if a patient is not reaching the satisfactory progress towards weight loss, the medications may be discontinued.  The thought is that if a person is taking a weight loss medication and not receiving the potential health benefit of that drug, the side effects are not worthwhile and use should be discontinued.  On the flip side, there are many people on some weight loss medications for years because it continues to be  an effective tool in their weight management and they are tolerating the medication without any long-term side effects.  Each person's response and purpose will be evaluated.      PHENTERMINE (Adipex): approved in 1959 for appetite suppression.  It has stimulant effects and cannot be used with Ritalin, Concerta, or other stimulants.  Although it is not highly addictive, it's chemically related to amphetamines which are addictive.  Occasional dependence can develop, but rarely. The most common side effects are dry mouth, increased energy and concentration, increased pulse, and constipation.  You should not take phentermine if you have glaucoma, hyperthyroidism, or uncontrolled/untreated hypertension or overly anxious. You should stop if dramatic mood swings, severe insomnia, palpations, chest pains, visual changes or if your Blood Pressure is consistently elevated or any time it's over 160/90.   It's ok to go off the med for a few weeks and restart if efficacy is wearing off.  $24-$30 for 90 tablets at [facility]. Females are required to have reliable birth control to reduce the risk birth defects/miscarriage.      TOPIRAMATE (Topamax): Anti-seizure medication, also used to prevent migraines and sometimes for mood stabilization.  Side effects include paresthesia, glaucoma, altered concentration, attention difficulties, memory and speech problems, metabolic acidosis, depression, increase in body temperature and decrease sweating, risk of kidney stones.  Do not take Topamax while taking Depakote as this can cause high ammonia levels.  You must have reliable birth control as Topamax can cause birth defects.  If prolonged use has occurred it should be tapered off slowly to avoid withdrawal issues.  Insurance usually covers Topiramate.  At higher doses, there may be some confusion/forgetfulness associated with this so we try to limit dose to under 75mg twice daily to reduce this risk. Often covered by insurance as  it's used for many reasons.  Topamax will cause carbonated beverages to taste bad. A recheck of your kidney/electrolytes may occur within a few months of starting.    QSYMIA (Phentermine + Topamax):  See above information about phentermine and Topamax.  Most common side effects are paresthesia, dizziness, distortion of taste, insomnia, constipation, and dry mouth.  See above descriptions for the two individual agents.Females are required to have reliable birth control to reduce the risk birth defects/miscarriage.  $150-$220 per month      Liraglutide (Victoza/Saxenda):   Part of the family of Glucagon Like Peptide Agonists, liraglutide directly suppresses appetite and is often used by diabetic patients due to decrease liver production of glucose, thereby improving glucose levels.  It also slows how quickly the stomach empties. May be hard to get covered for non diabetics and is an injectable medication delivered via a injector pen. It can be very costly without insurance coverage (over $500/month).  Small risk for pancreatitis and dose should be held if increased mid abdominal pain/burning. It is not to be used if previous Multiple Endocrine Neoplasia. In rodents, may increase risk of thyroid tumors and not indicated for anyone with hx of medullary thyroid cancer as a result.  If changes in voice/swallowing should be discontinued. Reliable birth control required in women.    Contrave (Bupropion/Naltrexone).    Synergistic combination of a mild appetite suppressing anti-depressant (Bupropion) whose effects are increased due to interaction with Naltrexone.  Naltrexone may have some effects on craving and is often used in addiction medicine to help previous opiate addicts be less prone to relapse as it blocks the action of opiates. Should be stopped if any need for opiate pain medication, surgery or planned procedures where you'll be given sedation/anesthesia. If prolonged use recommend stepping down bupropion over 2-3  "weeks to limit any risk of withdrawal issues. Side effects may include dry mouth, increased heart rate, mild elevation in Blood pressure;  dizziness, ringing in the ears, anxiety (typically due to bupropion), nausea, constipation, and some get fatigued with naltrexone.  About $210 on [facility] for 120 tabs of \"Contrave\", the brand name without insurance coverage. Generic Bupropion 75mg: $25 for 120 tabs, Naltrexone: $55 for 90 tabs without insurance coverage on [email or web address]. Cannot be used if pregnant/trying to conceive or breast feeding.      Plenity: 2-3 capsules taken 20 minutes before 2 meals daily provides crystals that expand into a gel that provides a mechanical fullness. Gel mixes with your next meal and increases satisfaction by bulking the meal up to feel bigger than it truly is. Plenity is not absorbed and gets passed through the digestive tract and excreted in stools. May cause some bloating/gas/full feeling as it behaves like a fiber in many ways. Cost not available yet. FDA cleared in March of 2019 but not available in stores as of March of 2020. Clearance safety and efficacy data done under \"Gelesis\" name. Not appropriate for people with stomach or bowel motility issues as requires you to pass it through digestive tract.  $98/month as of October 2020.           "

## 2021-06-12 NOTE — TELEPHONE ENCOUNTER
Done.    Yamila Rodriguez RN, CBN  [facility]  P [phone]  F [phone]

## 2021-06-12 NOTE — TELEPHONE ENCOUNTER
Please advise patient has a very strict PMAP plan that adheres to FDA diagnosis of medications.  If patient does not have type II diabetes this may be denied.

## 2021-06-12 NOTE — PROGRESS NOTES
"    New Medical Weight Management Consult    PATIENT:  Karla Quiñones  MRN:         [id number]  :         1980  JENISE:         2020    Dr. Dr. Sr    I had the pleasure of seeing your patient, Karla Quiñones.  Full intake/assessment was done to determine barriers to weight loss success and develop a treatment plan. Karla Quiñones is a 40 y.o. male interested in treatment of medical problems associated with weight.  He's never tried medication supported weight loss and we'll look to optimize his dietary/mindfulness and exercise tools while starting 18.75mg phentermine and looking to get coverage for liraglutide in light of his morbid obesity and prediabetes (A1c of 5.9% this past summer). Labs further suggested some risk for fatty liver disease w/ ALT increase to 81.  Weight loss should improve his metabolic syndrome and we'll shoot for a 10-20% reduction this year ideally.  Should he struggle, he's open to the possibility of surgical interventions (Mother and Sister have had procedures reportedly) but he'd like \"to try this first\".     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      He has started some gabapentin in the last year for some leg discomfort (normal B12 levels in July) and I cautioned him on the possible effects this medication can have on satiety and the importance of mindful eating/portion control and consider tapering off if he's not having dramatic benefit from the medication given his metabolic syndrome risks that would worsen w/ further weight gain/poor weight reduction.     Plan:  Plan:  1. Welcome to your weight loss season, to start aim for getting 3 meals a day about every 5 hours and each meal, to start should have about 35 grams of lean protein per meal, about 100g/day goal to start.  See below.    2. Start fitness program, consider HIIT after a couple weeks of gentle " "walking/exercise. Look to add in at least 2 days of some strength work.     3. For appetite suppression, continue metformin, we'll try to get Victoza approved given your excess weight and prediabetes and to curb appetite further, start a trial of phentermine:  Use half a tablet about 2-3 hours after breakfast but no closer than 12 hours before bedtime.   Don't use phentermine if too stimulated, if anxious, if sick or using any cold medications/decongestants as the combination can cause rapid heart rate and blood pressure elevation. Recheck efficacy and tolerance in about 4-6 weeks.     4. Follow up with dietician as planned. Pay attention to any hunger/success tendencies.  Food journaling helps.     5. Labs reviewed from July, recommend recheck CMP, vitamin d and PTH levels and thiamine given some of the foot issues.    6. Gabapentin can make it easier to overeat. If not getting much benefit from the medication, consider tapering off with your regular doctor (typically a 2-4 week process).       He has indication for Victoza given signs of metabolic syndrome, BMI of 41.7/morbid obesity, suggestion of fatty liver disease (ALT elevation of 81).       There were no vitals filed for this visit.  There is no height or weight on file to calculate BMI.  Weight is 334 lbs, Height is 6'3\" and BMI of 41.7.    ASSESSMENT:  Karla is a patient with mature  onset class iii obesity   with significant element of familial/genetic influence and   with current health consequences. He   does need aggressive weight loss plan due to presence of metabolic syndrome defined by Class III obesity, elevated triglygcerides, prediabetes A1c of 5.9% and suggestion of some fatty liver disease with previous hypertension but no longer using norvasc.    .  Karla Quiñones eats a high carb diet, uses food as mood management and has high sugar beverage intake in the form of juices.    His problem is complicated by a hunger disorder    His ability to " lose weight is impacted by inability to perceive that food intake is at a level that prevents weight loss.    PLAN:    Eat breakfast daily, Decrease portion sizes, No meals in front of TV screen, Decrease caloric beverages by avoiding juice, increasing water intake, Dietician visit of education and Seek structured exercise program    Strong genetic or hunger disorder  Ancillary testing:  N/A.  Food Plan:  High protein/low carbohydrate.  Activity Plan:  Activity journal.  Supplementary:  N/A.  Medication:  The patient will begin medication in pursuit of improved medical status as influenced by body weight. He will start phentermine. Blood pressure and cardiac status are acceptable and will see if liraglutide approval is possible.  There is a mutual understanding of the goals and risks of this therapy. The patient is in agreement. He is educated on dosage regimen and possible side effects.        No orders of the defined types were placed in this encounter.      He has the following co-morbidities:    [facility] CO-MORBIDITIES 11/3/2020   How has your weight changed over the last year?  Gained   How many pounds? 30   I have the following health issues associated with obesity: Pre-Diabetes   I have the following symptoms associated with obesity: Knee Pain       Patient goals reviewed with patient 11/3/2020   I am interested in having a healthier weight to diminish current health problems: Yes   I am interested in having a healthier weight in order to prevent future health problems: Yes   I am interested in having a healthier weight in order to have a future surgery: No   I have the following family history of obesity/being overweight:  My mother is overweight   I have the following family history of obesity/being overweight:  My mother is overweight   sister and mom had bariatric surgery.    Referring Provider 11/3/2020   Please name the provider who referred you to Medical Weight Management.  If you do not know,  "please answer: \"I Don't Know\". My doctor        Weight History Reviewed With Patient 11/3/2020   How concerned are you about your weight? Very Concerned   Would you describe your weight gain as gradual? Yes   I became overweight: As an Adult   The following factors have contributed to my weight gain:  Eating Wrong Types of Food   My lowest weight since age 16 was: 190   My highest weight since age 16 was: 335   The most weight I have ever lost was: (lbs) 80 lbs when watching meals and then after working overnights regained.       Diet Recall Reviewed With Patient 11/3/2020   Do you typically eat breakfast? No   Do you typically eat lunch? No   Do you typically eat supper? Yes   Do you typically eat snacks? Yes   If you do snack, what types of food do you typically eat? Cookies   Do you like vegetables?  Yes   Do you drink water?  Yes   How many glasses of juice do you drink in a typical day? 6   How many of glasses of milk do you drink in a typical day? 1   If you do drink milk, what type? Whole   How many 8oz glasses of sugar containing drinks such as Gt-Aid/sweet tea do you drink in a day? 4:  Juices mostly, no soda.    How many cans/bottles of sugar pop/soda/tea/sports drinks do you drink in a day? 0   How many cans/bottles of sugar pop/soda/tea/sports drinks do you drink in a day? 0   How often do you have a drink of alcohol? 2-4 Times a Month   If you do drink, how many drinks might you have in a day? 3-4       Eating Habits Reviewed With Patient 11/3/2020   Eats Starches Almost Everyday   Generally, my meals include foods like these: fried meats, brats, burgers, french fries, pizza, cheese, chips, or ice cream. A Few Times a Week   Eat fast food (like McDonalds, [employer] Noah, Taco Bell). Less Than Weekly   Buffet Less Than Weekly   Watches TV A Few Times a Week   Often skip meals, eat at random times, have no regular eating times. Almost Everyday   Grazes Less Than Weekly   Eat extra snacks between meals. " Less Than Weekly   Eat most of my food at the end of the day. Less Than Weekly   Eats at Night Never   Eat extra snacks to prevent or correct low blood sugar. Never   Eat to prevent acid reflux or stomach pain. Never   Worry about not having enough food to eat. Never   Have you been to the food shelf at least a few times this year? No   I eat when I am depressed. Never   I eat when I am stressed. Never   I eat when I am bored. Never   I eat when I am anxious. Never   I eat when I am happy or as a reward. Never   I feel hungry all the time even if I just have eaten. Never   Feeling full is important to me. Never   I finish all the food on my plate even if I am already full. Never   I can't resist eating delicious food or walk past the good food/smell. Never   I eat/snack without noticing that I am eating. Never   I eat when I am preparing the meal. Never   I eat more than usual when I see others eating. Never   I have trouble not eating sweets, ice cream, cookies, or chips if they are around the house. Never   I think about food all day. Never   Please list any other foods you crave. 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      Activity/Exercise History Reviewed With Patient 11/3/2020   How much of a typical 12 hour day do you spend sitting? Half the Day   How much of a typical 12 hour day do you spend lying down? Less Than Half the Day   How much of a typical day do you spend walking/standing? Half the Day   How many hours (not including work) do you spend on the TV/Video Games/Computer/Tablet/Phone? 2-3 Hours   How many times a week are you active for the purpose of exercise? Never   How many total minutes do you spend doing some activity for the purpose of exercising when you exercise? None   What keeps you from being more active? Other     Walking up to 2 miles regularly recently the last 2 weeks, 45 minutes.  PAST MEDICAL HISTORY:  No past medical history on file.    Work/Social History  "Reviewed With Patient 11/3/2020   My employment status is: Part-Time   My job is: : daytime mostly.   How much of your job is spent on the computer or phone? 50%   How many hours do you spend commuting to work daily?  30 minutes   What is your marital status? Single   If in a relationship, is your significant other overweight? Yes   Do you have children? Yes   If you have children, are they overweight? No   Who do you live with?  Family   Are they supportive of your health goals? Yes   Who does the food shopping?  Me       Mental Health History Reviewed With Patient 11/3/2020   Have you ever been physically or sexually abused? No   If yes, do you feel that the abuse is affecting your weight? No   If yes, would you like to talk to a counselor about the abuse? No   How often in the past 2 weeks have you felt little interest or pleasure in doing things? Not at all   Over the past 2 weeks how often have you felt down, depressed, or hopeless? Not at all       Sleep History Reviewed With Patient 11/3/2020   How many hours do you sleep at night? 5   Do you think that you snore loudly or has anybody ever heard you snore loudly (louder than talking or so loud it can be heard behind a shut door)? No   Has anyone seen or heard you stop breathing during your sleep? No   Do you often feel tired, fatigued, or sleepy during the day? No   Do you have a TV/Computer or other screens in your bedroom? Yes       MEDICATIONS:   Current Outpatient Medications   Medication Sig Dispense Refill     gabapentin (NEURONTIN) 600 MG tablet Take 1,200 mg by mouth.       metFORMIN (GLUCOPHAGE-XR) 500 MG 24 hr tablet Take 500 mg by mouth.       No current facility-administered medications for this visit.        ALLERGIES:   No Known Allergies    PHYSICAL EXAM:reports self weight of 334 lbs, Height 6'3\". BMI 41.7.   A and O, Mallmapati III airway. Central adiposity. No cough/dyspnea. Good cognition. Black complexion.    There were no " vitals filed for this visit.   No data recorded      Waist Circumference:      Wt Readings from Last 4 Encounters:   No data found for Wt     A & O x 3  HEENT: NCAT, mucous membranes moist  Respirations unlabored  Location of obesity: Central Obesity    FOLLOW-UP:    4 weeks.    TIME: 60 min spent on evaluation, management, counseling, education, & motivational interviewing with greater than 50 % of the total time was spent on counseling and coordinating care    Sincerely,    Brian Mina MD      1:00 PM
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
## 2021-06-13 NOTE — TELEPHONE ENCOUNTER
Prior Authorization Request  Who s requesting:  Pharmacy  Pharmacy Name and Location: [facility]  Medication Name: Victoza  Insurance Plan: Medicare/Medicaid  Insurance Member ID Number:  [id number]  CoverMyMeds Key: [id number]  Informed patient that prior authorizations can take up to 10 business days for response:   Yes  Okay to leave a detailed message: Yes

## 2021-06-14 NOTE — TELEPHONE ENCOUNTER
Called patient when he was not connected for video nutrition visit follow up. Sent patient SMS text video link to connect or let him know he can call the clinic to reschedule, phone number was provided.

## 2021-06-17 NOTE — TELEPHONE ENCOUNTER
Telephone Encounter by Lida Mathis at 11/18/2020  2:45 PM     Author: Lida Mathis Service: -- Author Type: --    Filed: 11/18/2020  2:46 PM Encounter Date: 11/18/2020 Status: Signed    : Lida Mathis       Duplicate encounter, please see telephone encounter date 11/5/2020.  If provider wants to appeal please write letter of medical necessity and route back original denial encounter 11/5/2020 for PA team to initiate.

## 2021-06-17 NOTE — TELEPHONE ENCOUNTER
Telephone Encounter by Lida Mathis at 11/7/2020 10:26 PM     Author: Lida Mathis Service: -- Author Type: --    Filed: 11/7/2020 10:29 PM Encounter Date: 11/5/2020 Status: Signed    : Lida Mathis       PRIOR AUTHORIZATION DENIED    Denial Rational: Patient does not have diabetes. [place] medicaid does not cover medications used for weight loss  Letter information from provider was added to prior authorization request but was still denied.  Patient's insurance through [employer] is a very strict PMAP plan that follows FDA guidelines.       Appeal Information: This medication was denied. If physician would like to appeal because patient has contraindication or allergy to covered medication please write letter of medical necessity and route back to PA team to initiate.  If no further action is needed please close encounter thank you.

## 2021-08-15 ENCOUNTER — HEALTH MAINTENANCE LETTER (OUTPATIENT)
Age: 41
End: 2021-08-15

## 2021-10-06 ENCOUNTER — LAB REQUISITION (OUTPATIENT)
Dept: LAB | Facility: CLINIC | Age: 41
End: 2021-10-06
Payer: COMMERCIAL

## 2021-10-06 DIAGNOSIS — G62.9 POLYNEUROPATHY, UNSPECIFIED: ICD-10-CM

## 2021-10-06 DIAGNOSIS — R25.2 CRAMP AND SPASM: ICD-10-CM

## 2021-10-06 DIAGNOSIS — E66.01 MORBID (SEVERE) OBESITY DUE TO EXCESS CALORIES (H): ICD-10-CM

## 2021-10-06 DIAGNOSIS — I10 ESSENTIAL (PRIMARY) HYPERTENSION: ICD-10-CM

## 2021-10-06 DIAGNOSIS — Z13.220 ENCOUNTER FOR SCREENING FOR LIPOID DISORDERS: ICD-10-CM

## 2021-10-06 LAB
ALBUMIN SERPL-MCNC: 3.8 G/DL (ref 3.4–5)
ALP SERPL-CCNC: 89 U/L (ref 40–150)
ALT SERPL W P-5'-P-CCNC: 43 U/L (ref 0–70)
ANION GAP SERPL CALCULATED.3IONS-SCNC: 9 MMOL/L (ref 3–14)
AST SERPL W P-5'-P-CCNC: 30 U/L (ref 0–45)
BILIRUB SERPL-MCNC: 0.6 MG/DL (ref 0.2–1.3)
BUN SERPL-MCNC: 14 MG/DL (ref 7–30)
CALCIUM SERPL-MCNC: 9.2 MG/DL (ref 8.5–10.1)
CHLORIDE BLD-SCNC: 109 MMOL/L (ref 94–109)
CHOLEST SERPL-MCNC: 200 MG/DL
CO2 SERPL-SCNC: 25 MMOL/L (ref 20–32)
CREAT SERPL-MCNC: 1.12 MG/DL (ref 0.66–1.25)
CREAT UR-MCNC: 245 MG/DL
FASTING STATUS PATIENT QL REPORTED: NO
GFR SERPL CREATININE-BSD FRML MDRD: 80 ML/MIN/1.73M2
GLUCOSE BLD-MCNC: 122 MG/DL (ref 70–99)
HDLC SERPL-MCNC: 49 MG/DL
LDLC SERPL CALC-MCNC: 111 MG/DL
NONHDLC SERPL-MCNC: 151 MG/DL
POTASSIUM BLD-SCNC: 3.6 MMOL/L (ref 3.4–5.3)
PROT SERPL-MCNC: 7.8 G/DL (ref 6.8–8.8)
PROT UR-MCNC: 0.12 G/L
PROT/CREAT 24H UR: 0.05 G/G CR (ref 0–0.2)
SODIUM SERPL-SCNC: 143 MMOL/L (ref 133–144)
TRIGL SERPL-MCNC: 200 MG/DL
TSH SERPL DL<=0.005 MIU/L-ACNC: 1.14 MU/L (ref 0.4–4)

## 2021-10-06 PROCEDURE — 80061 LIPID PANEL: CPT | Mod: ORL | Performed by: FAMILY MEDICINE

## 2021-10-06 PROCEDURE — 84156 ASSAY OF PROTEIN URINE: CPT | Mod: ORL | Performed by: FAMILY MEDICINE

## 2021-10-06 PROCEDURE — 84443 ASSAY THYROID STIM HORMONE: CPT | Mod: ORL | Performed by: FAMILY MEDICINE

## 2021-10-06 PROCEDURE — 80053 COMPREHEN METABOLIC PANEL: CPT | Mod: ORL | Performed by: FAMILY MEDICINE

## 2021-10-11 ENCOUNTER — HEALTH MAINTENANCE LETTER (OUTPATIENT)
Age: 41
End: 2021-10-11

## 2022-04-13 ENCOUNTER — HOSPITAL ENCOUNTER (INPATIENT)
Facility: CLINIC | Age: 42
LOS: 2 days | Discharge: HOME | End: 2022-04-15
Attending: EMERGENCY MEDICINE | Admitting: FAMILY MEDICINE
Payer: COMMERCIAL

## 2022-04-13 DIAGNOSIS — Z79.4 TYPE 2 DIABETES MELLITUS WITH HYPERGLYCEMIA, WITH LONG-TERM CURRENT USE OF INSULIN (H): ICD-10-CM

## 2022-04-13 DIAGNOSIS — E11.65 TYPE 2 DIABETES MELLITUS WITH HYPERGLYCEMIA, WITH LONG-TERM CURRENT USE OF INSULIN (H): ICD-10-CM

## 2022-04-13 DIAGNOSIS — E87.5 HYPERKALEMIA: ICD-10-CM

## 2022-04-13 DIAGNOSIS — E11.9 DIABETES MELLITUS, NEW ONSET (H): ICD-10-CM

## 2022-04-13 DIAGNOSIS — R73.9 HYPERGLYCEMIA: ICD-10-CM

## 2022-04-13 DIAGNOSIS — E11.10 DIABETIC KETOACIDOSIS WITHOUT COMA ASSOCIATED WITH TYPE 2 DIABETES MELLITUS (H): Primary | ICD-10-CM

## 2022-04-13 DIAGNOSIS — N17.9 AKI (ACUTE KIDNEY INJURY) (H): ICD-10-CM

## 2022-04-13 LAB
ALBUMIN SERPL-MCNC: 4.3 G/DL (ref 3.5–5)
ALP SERPL-CCNC: 106 U/L (ref 45–120)
ALT SERPL W P-5'-P-CCNC: 43 U/L (ref 0–45)
ANION GAP SERPL CALCULATED.3IONS-SCNC: 19 MMOL/L (ref 5–18)
AST SERPL W P-5'-P-CCNC: 29 U/L (ref 0–40)
ATRIAL RATE - MUSE: 94 BPM
BASE EXCESS BLDV CALC-SCNC: -4.5 MMOL/L
BASOPHILS # BLD AUTO: 0 10E3/UL (ref 0–0.2)
BASOPHILS NFR BLD AUTO: 0 %
BILIRUB DIRECT SERPL-MCNC: 0.2 MG/DL
BILIRUB SERPL-MCNC: 0.9 MG/DL (ref 0–1)
BUN SERPL-MCNC: 23 MG/DL (ref 8–22)
C REACTIVE PROTEIN LHE: 0.7 MG/DL (ref 0–0.8)
CALCIUM SERPL-MCNC: 9.9 MG/DL (ref 8.5–10.5)
CHLORIDE BLD-SCNC: 99 MMOL/L (ref 98–107)
CO2 SERPL-SCNC: 17 MMOL/L (ref 22–31)
CREAT SERPL-MCNC: 1.75 MG/DL (ref 0.7–1.3)
DIASTOLIC BLOOD PRESSURE - MUSE: NORMAL MMHG
EOSINOPHIL # BLD AUTO: 0 10E3/UL (ref 0–0.7)
EOSINOPHIL NFR BLD AUTO: 0 %
ERYTHROCYTE [DISTWIDTH] IN BLOOD BY AUTOMATED COUNT: 11.7 % (ref 10–15)
GFR SERPL CREATININE-BSD FRML MDRD: 49 ML/MIN/1.73M2
GLUCOSE BLD-MCNC: 460 MG/DL (ref 70–125)
GLUCOSE BLDC GLUCOMTR-MCNC: 171 MG/DL (ref 70–99)
GLUCOSE BLDC GLUCOMTR-MCNC: 194 MG/DL (ref 70–99)
GLUCOSE BLDC GLUCOMTR-MCNC: 232 MG/DL (ref 70–99)
GLUCOSE BLDC GLUCOMTR-MCNC: 291 MG/DL (ref 70–99)
GLUCOSE BLDC GLUCOMTR-MCNC: 354 MG/DL (ref 70–99)
GLUCOSE BLDC GLUCOMTR-MCNC: 358 MG/DL (ref 70–99)
GLUCOSE BLDC GLUCOMTR-MCNC: 459 MG/DL (ref 70–99)
HBA1C MFR BLD: 11.8 %
HCO3 BLDV-SCNC: 18 MMOL/L (ref 24–30)
HCT VFR BLD AUTO: 51.2 % (ref 40–53)
HGB BLD-MCNC: 17.2 G/DL (ref 13.3–17.7)
IMM GRANULOCYTES # BLD: 0 10E3/UL
IMM GRANULOCYTES NFR BLD: 0 %
INTERPRETATION ECG - MUSE: NORMAL
KETONES BLD-SCNC: 1.13 MMOL/L
KETONES BLD-SCNC: 4.23 MMOL/L
LYMPHOCYTES # BLD AUTO: 2.2 10E3/UL (ref 0.8–5.3)
LYMPHOCYTES NFR BLD AUTO: 24 %
MAGNESIUM SERPL-MCNC: 2.3 MG/DL (ref 1.8–2.6)
MAGNESIUM SERPL-MCNC: 2.3 MG/DL (ref 1.8–2.6)
MCH RBC QN AUTO: 29.1 PG (ref 26.5–33)
MCHC RBC AUTO-ENTMCNC: 33.6 G/DL (ref 31.5–36.5)
MCV RBC AUTO: 87 FL (ref 78–100)
MONOCYTES # BLD AUTO: 0.6 10E3/UL (ref 0–1.3)
MONOCYTES NFR BLD AUTO: 6 %
NEUTROPHILS # BLD AUTO: 6.2 10E3/UL (ref 1.6–8.3)
NEUTROPHILS NFR BLD AUTO: 70 %
NRBC # BLD AUTO: 0 10E3/UL
NRBC BLD AUTO-RTO: 0 /100
OXYHGB MFR BLDV: 25.2 % (ref 70–75)
P AXIS - MUSE: 58 DEGREES
PCO2 BLDV: 50 MM HG (ref 35–50)
PH BLDV: 7.26 [PH] (ref 7.35–7.45)
PHOSPHATE SERPL-MCNC: 2.8 MG/DL (ref 2.5–4.5)
PHOSPHATE SERPL-MCNC: 4 MG/DL (ref 2.5–4.5)
PLATELET # BLD AUTO: 269 10E3/UL (ref 150–450)
PO2 BLDV: 19 MM HG (ref 25–47)
POTASSIUM BLD-SCNC: 4.2 MMOL/L (ref 3.5–5)
POTASSIUM BLD-SCNC: 5.6 MMOL/L (ref 3.5–5)
PR INTERVAL - MUSE: 148 MS
PROT SERPL-MCNC: 8.8 G/DL (ref 6–8)
QRS DURATION - MUSE: 86 MS
QT - MUSE: 340 MS
QTC - MUSE: 425 MS
R AXIS - MUSE: 7 DEGREES
RBC # BLD AUTO: 5.91 10E6/UL (ref 4.4–5.9)
SAO2 % BLDV: 25.6 % (ref 70–75)
SARS-COV-2 RNA RESP QL NAA+PROBE: NEGATIVE
SODIUM SERPL-SCNC: 135 MMOL/L (ref 136–145)
SYSTOLIC BLOOD PRESSURE - MUSE: NORMAL MMHG
T AXIS - MUSE: 42 DEGREES
TSH SERPL DL<=0.005 MIU/L-ACNC: 1.16 UIU/ML (ref 0.3–5)
VENTRICULAR RATE- MUSE: 94 BPM
WBC # BLD AUTO: 9 10E3/UL (ref 4–11)

## 2022-04-13 PROCEDURE — 83735 ASSAY OF MAGNESIUM: CPT | Performed by: EMERGENCY MEDICINE

## 2022-04-13 PROCEDURE — 210N000002 HC R&B HEART CARE

## 2022-04-13 PROCEDURE — 36415 COLL VENOUS BLD VENIPUNCTURE: CPT | Performed by: FAMILY MEDICINE

## 2022-04-13 PROCEDURE — 85025 COMPLETE CBC W/AUTO DIFF WBC: CPT | Performed by: EMERGENCY MEDICINE

## 2022-04-13 PROCEDURE — 82248 BILIRUBIN DIRECT: CPT | Performed by: EMERGENCY MEDICINE

## 2022-04-13 PROCEDURE — 99223 1ST HOSP IP/OBS HIGH 75: CPT | Performed by: FAMILY MEDICINE

## 2022-04-13 PROCEDURE — 93005 ELECTROCARDIOGRAM TRACING: CPT | Performed by: FAMILY MEDICINE

## 2022-04-13 PROCEDURE — 84132 ASSAY OF SERUM POTASSIUM: CPT | Performed by: FAMILY MEDICINE

## 2022-04-13 PROCEDURE — 87635 SARS-COV-2 COVID-19 AMP PRB: CPT | Performed by: EMERGENCY MEDICINE

## 2022-04-13 PROCEDURE — 84443 ASSAY THYROID STIM HORMONE: CPT | Performed by: EMERGENCY MEDICINE

## 2022-04-13 PROCEDURE — 84100 ASSAY OF PHOSPHORUS: CPT | Performed by: FAMILY MEDICINE

## 2022-04-13 PROCEDURE — 99285 EMERGENCY DEPT VISIT HI MDM: CPT | Mod: 25

## 2022-04-13 PROCEDURE — 82010 KETONE BODYS QUAN: CPT | Performed by: FAMILY MEDICINE

## 2022-04-13 PROCEDURE — 258N000003 HC RX IP 258 OP 636: Performed by: EMERGENCY MEDICINE

## 2022-04-13 PROCEDURE — C9803 HOPD COVID-19 SPEC COLLECT: HCPCS

## 2022-04-13 PROCEDURE — 96374 THER/PROPH/DIAG INJ IV PUSH: CPT

## 2022-04-13 PROCEDURE — 96361 HYDRATE IV INFUSION ADD-ON: CPT

## 2022-04-13 PROCEDURE — 86140 C-REACTIVE PROTEIN: CPT | Performed by: EMERGENCY MEDICINE

## 2022-04-13 PROCEDURE — 80053 COMPREHEN METABOLIC PANEL: CPT | Performed by: EMERGENCY MEDICINE

## 2022-04-13 PROCEDURE — 250N000012 HC RX MED GY IP 250 OP 636 PS 637: Performed by: EMERGENCY MEDICINE

## 2022-04-13 PROCEDURE — 82805 BLOOD GASES W/O2 SATURATION: CPT | Performed by: EMERGENCY MEDICINE

## 2022-04-13 PROCEDURE — 93005 ELECTROCARDIOGRAM TRACING: CPT

## 2022-04-13 PROCEDURE — 258N000003 HC RX IP 258 OP 636: Performed by: FAMILY MEDICINE

## 2022-04-13 PROCEDURE — 83735 ASSAY OF MAGNESIUM: CPT | Performed by: FAMILY MEDICINE

## 2022-04-13 PROCEDURE — 36415 COLL VENOUS BLD VENIPUNCTURE: CPT | Performed by: EMERGENCY MEDICINE

## 2022-04-13 PROCEDURE — 999N000054 HC STATISTIC EKG NON-CHARGEABLE

## 2022-04-13 PROCEDURE — 82010 KETONE BODYS QUAN: CPT | Performed by: EMERGENCY MEDICINE

## 2022-04-13 PROCEDURE — 83036 HEMOGLOBIN GLYCOSYLATED A1C: CPT | Performed by: EMERGENCY MEDICINE

## 2022-04-13 RX ORDER — SODIUM CHLORIDE AND POTASSIUM CHLORIDE 150; 900 MG/100ML; MG/100ML
INJECTION, SOLUTION INTRAVENOUS CONTINUOUS
Status: DISCONTINUED | OUTPATIENT
Start: 2022-04-13 | End: 2022-04-14

## 2022-04-13 RX ORDER — DEXTROSE MONOHYDRATE, SODIUM CHLORIDE, AND POTASSIUM CHLORIDE 50; 1.49; 4.5 G/1000ML; G/1000ML; G/1000ML
INJECTION, SOLUTION INTRAVENOUS CONTINUOUS
Status: DISCONTINUED | OUTPATIENT
Start: 2022-04-13 | End: 2022-04-14

## 2022-04-13 RX ORDER — DEXTROSE MONOHYDRATE 25 G/50ML
25-50 INJECTION, SOLUTION INTRAVENOUS
Status: DISCONTINUED | OUTPATIENT
Start: 2022-04-13 | End: 2022-04-15 | Stop reason: HOSPADM

## 2022-04-13 RX ORDER — NICOTINE POLACRILEX 4 MG
15-30 LOZENGE BUCCAL
Status: DISCONTINUED | OUTPATIENT
Start: 2022-04-13 | End: 2022-04-15 | Stop reason: HOSPADM

## 2022-04-13 RX ORDER — ONDANSETRON 4 MG/1
4 TABLET, ORALLY DISINTEGRATING ORAL EVERY 8 HOURS PRN
Qty: 20 TABLET | Refills: 0 | Status: SHIPPED | OUTPATIENT
Start: 2022-04-13

## 2022-04-13 RX ORDER — DEXTROSE MONOHYDRATE 25 G/50ML
25-50 INJECTION, SOLUTION INTRAVENOUS
Status: DISCONTINUED | OUTPATIENT
Start: 2022-04-13 | End: 2022-04-15

## 2022-04-13 RX ORDER — SODIUM CHLORIDE 450 MG/100ML
1000 INJECTION, SOLUTION INTRAVENOUS CONTINUOUS
Status: DISCONTINUED | OUTPATIENT
Start: 2022-04-13 | End: 2022-04-14

## 2022-04-13 RX ORDER — LIDOCAINE 40 MG/G
CREAM TOPICAL
Status: DISCONTINUED | OUTPATIENT
Start: 2022-04-13 | End: 2022-04-15 | Stop reason: HOSPADM

## 2022-04-13 RX ORDER — SODIUM CHLORIDE 9 MG/ML
INJECTION, SOLUTION INTRAVENOUS CONTINUOUS
Status: DISCONTINUED | OUTPATIENT
Start: 2022-04-13 | End: 2022-04-14

## 2022-04-13 RX ADMIN — SODIUM CHLORIDE 1000 ML: 9 INJECTION, SOLUTION INTRAVENOUS at 16:07

## 2022-04-13 RX ADMIN — SODIUM CHLORIDE: 9 INJECTION, SOLUTION INTRAVENOUS at 23:08

## 2022-04-13 RX ADMIN — SODIUM CHLORIDE 5 UNITS/HR: 9 INJECTION, SOLUTION INTRAVENOUS at 17:25

## 2022-04-13 RX ADMIN — SODIUM CHLORIDE 1000 ML: 9 INJECTION, SOLUTION INTRAVENOUS at 17:21

## 2022-04-13 ASSESSMENT — ACTIVITIES OF DAILY LIVING (ADL)
TOILETING_ISSUES: NO
EATING: 0-->INDEPENDENT
DOING_ERRANDS_INDEPENDENTLY_DIFFICULTY: NO
SWALLOWING: 0-->SWALLOWS FOODS/LIQUIDS WITHOUT DIFFICULTY
HEARING_DIFFICULTY_OR_DEAF: NO
ADLS_ACUITY_SCORE: 9
CHANGE_IN_FUNCTIONAL_STATUS_SINCE_ONSET_OF_CURRENT_ILLNESS/INJURY: NO
DEPENDENT_IADLS:: INDEPENDENT
ADLS_ACUITY_SCORE: 8
WEAR_GLASSES_OR_BLIND: YES
FALL_HISTORY_WITHIN_LAST_SIX_MONTHS: NO
ADLS_ACUITY_SCORE: 7
EATING/SWALLOWING: EATING
VISION_MANAGEMENT: GLASSES
DRESSING/BATHING_DIFFICULTY: NO
DIFFICULTY_EATING/SWALLOWING: YES
ADLS_ACUITY_SCORE: 7
WALKING_OR_CLIMBING_STAIRS_DIFFICULTY: NO
EATING: 0-->INDEPENDENT
ADLS_ACUITY_SCORE: 8
SWALLOWING: 0-->SWALLOWS FOODS/LIQUIDS WITHOUT DIFFICULTY (DEVELOPMENTALLY APPROPRIATE)

## 2022-04-13 ASSESSMENT — ENCOUNTER SYMPTOMS
VOMITING: 1
NAUSEA: 1
FREQUENCY: 1
ABDOMINAL PAIN: 1

## 2022-04-13 NOTE — ED PROVIDER NOTES
Expected Patient Referral to ED  2:18 PM    Referring Clinic/Provider:  [facility]    Reason for referral/Clinical facts:  - new diabetes with mild DKA, vomiting & abdominal pain, bicarb 16 AG 22, given 2L & Zofran, CT abdomen/pelvis, K 5.3  - will give regular insulin prior to sending to the ED    Recommendations provided:  Send to ED for further evaluation    Caller was informed that this institution does possess the capabilities and/or resources to provide for patient and should be transferred to our facility.    Discussed that if direct admit is sought and any hurdles are encountered, this ED would be happy to see the patient and evaluate.    Informed caller that recommendations provided are recommendations based only on the facts provided and that they responsible to accept or reject the advice, or to seek a formal in person consultation as needed and that this ED will see/treat patient should they arrive.      Maikol Rodriguez MD  Emergency Medicine  [facility] EMERGENCY ROOM  [address] [place] [id number] [phone]     Maikol Rodriguez MD  04/13/22 [id number]

## 2022-04-13 NOTE — PHARMACY-ADMISSION MEDICATION HISTORY
Pharmacy Note - Admission Medication History    Pertinent Provider Information: Patient reports has been taking metformin although fill history shows last fill in May 2021. Was also previously taking lisinopril 10 mg daily but has not taken for several months.      ______________________________________________________________________    Prior To Admission (PTA) med list completed and updated in EMR.       PTA Med List   Medication Sig Last Dose     metFORMIN (GLUCOPHAGE-XR) 500 MG 24 hr tablet Take 500 mg by mouth daily (with breakfast) 4/11/2022 at AM     ondansetron (ZOFRAN ODT) 4 MG ODT tab Take 1 tablet (4 mg) by mouth every 8 hours as needed for nausea        Information source(s): Patient and CareEverywhere/SureScripts  Method of interview communication: in-person    Summary of Changes to PTA Med List  New: N/A  Discontinued: gabapentin, Victoza, Phentermine  Changed: N/A    Patient was asked about OTC/herbal products specifically.  PTA med list reflects this.    In the past week, patient estimated taking medication this percent of the time:  50-90% due to other.    Allergies were reviewed, assessed, and updated with the patient.      Patient does not use any multi-dose medications prior to admission.    The information provided in this note is only as accurate as the sources available at the time of the update(s).    Thank you for the opportunity to participate in the care of this patient.    Kilo Ash [facility]  4/13/2022 4:55 PM

## 2022-04-13 NOTE — ED TRIAGE NOTES
Pt arrived by EMS from [facility], pt has been vomiting since Thursday and frequent urination, no Hx of diabetes.  at [facility] was given 2L IVF and Zofran and 8 units of insulin given prior to arrival.
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
## 2022-04-13 NOTE — DISCHARGE INSTRUCTIONS
DIABETES REMINDERS:  1) Check your blood sugar 2-4 times per day depending on if taking meal insulin or not.   Always bring your blood sugar log and meter to your diabetes-related appointments.  2) Your blood sugar goals:  80-130mg/dL before eating  and  mg/dL 2 hours after eating (or per your doctor).  3) Always be prepared to treat a low blood sugar should it happen. Keep a sugar-containing beverage or food nearby.  4) When to call your clinic:   Blood sugar over 400 mg/dL.   If you have 2 to 3 low blood sugars (under 70mg/dL) in a row,   Low reading the same time of day several days in a row,  Blood sugars elevated and you can not get them down with your usual diabetes regimen,  You are ill and can't keep blood sugars controlled.   5) When to call 911:  If your blood glucose does not get better with treatment, or if you/someone else is unable to give you treatment.  6) Talk to your primary care doctor about an appointment with a Certified Diabetes Educator to assist you with your BG management.  7) Follow insulin regimen on discharge orders until able to see provider where doses may be adjusted based on blood sugar patterns.

## 2022-04-14 PROBLEM — E87.5 HYPERKALEMIA: Status: ACTIVE | Noted: 2022-04-14

## 2022-04-14 PROBLEM — E11.9 DIABETES MELLITUS, NEW ONSET (H): Status: ACTIVE | Noted: 2022-04-14

## 2022-04-14 LAB
ALBUMIN SERPL-MCNC: 3.4 G/DL (ref 3.5–5)
ALP SERPL-CCNC: 80 U/L (ref 45–120)
ALT SERPL W P-5'-P-CCNC: 29 U/L (ref 0–45)
ANION GAP SERPL CALCULATED.3IONS-SCNC: 12 MMOL/L (ref 5–18)
AST SERPL W P-5'-P-CCNC: 23 U/L (ref 0–40)
BILIRUB DIRECT SERPL-MCNC: 0.3 MG/DL
BILIRUB SERPL-MCNC: 1 MG/DL (ref 0–1)
BUN SERPL-MCNC: 16 MG/DL (ref 8–22)
CALCIUM SERPL-MCNC: 9 MG/DL (ref 8.5–10.5)
CHLORIDE BLD-SCNC: 111 MMOL/L (ref 98–107)
CO2 SERPL-SCNC: 17 MMOL/L (ref 22–31)
CREAT SERPL-MCNC: 1.17 MG/DL (ref 0.7–1.3)
ERYTHROCYTE [DISTWIDTH] IN BLOOD BY AUTOMATED COUNT: 11.9 % (ref 10–15)
GFR SERPL CREATININE-BSD FRML MDRD: 79 ML/MIN/1.73M2
GLUCOSE BLD-MCNC: 152 MG/DL (ref 70–125)
GLUCOSE BLDC GLUCOMTR-MCNC: 137 MG/DL (ref 70–99)
GLUCOSE BLDC GLUCOMTR-MCNC: 155 MG/DL (ref 70–99)
GLUCOSE BLDC GLUCOMTR-MCNC: 156 MG/DL (ref 70–99)
GLUCOSE BLDC GLUCOMTR-MCNC: 160 MG/DL (ref 70–99)
GLUCOSE BLDC GLUCOMTR-MCNC: 162 MG/DL (ref 70–99)
GLUCOSE BLDC GLUCOMTR-MCNC: 168 MG/DL (ref 70–99)
GLUCOSE BLDC GLUCOMTR-MCNC: 169 MG/DL (ref 70–99)
GLUCOSE BLDC GLUCOMTR-MCNC: 181 MG/DL (ref 70–99)
GLUCOSE BLDC GLUCOMTR-MCNC: 230 MG/DL (ref 70–99)
GLUCOSE BLDC GLUCOMTR-MCNC: 232 MG/DL (ref 70–99)
GLUCOSE BLDC GLUCOMTR-MCNC: 265 MG/DL (ref 70–99)
GLUCOSE BLDC GLUCOMTR-MCNC: 272 MG/DL (ref 70–99)
HCT VFR BLD AUTO: 43.9 % (ref 40–53)
HGB BLD-MCNC: 14.7 G/DL (ref 13.3–17.7)
KETONES BLD-SCNC: 0.31 MMOL/L
KETONES BLD-SCNC: 0.39 MMOL/L
KETONES BLD-SCNC: 0.4 MMOL/L
KETONES BLD-SCNC: 0.41 MMOL/L
KETONES BLD-SCNC: 0.54 MMOL/L
KETONES BLD-SCNC: 0.57 MMOL/L
KETONES BLD-SCNC: 0.66 MMOL/L
KETONES BLD-SCNC: 0.72 MMOL/L
KETONES BLD-SCNC: 0.89 MMOL/L
KETONES BLD-SCNC: 1 MMOL/L
KETONES BLD-SCNC: 1.13 MMOL/L
MAGNESIUM SERPL-MCNC: 2.2 MG/DL (ref 1.8–2.6)
MCH RBC QN AUTO: 29.5 PG (ref 26.5–33)
MCHC RBC AUTO-ENTMCNC: 33.5 G/DL (ref 31.5–36.5)
MCV RBC AUTO: 88 FL (ref 78–100)
PHOSPHATE SERPL-MCNC: 3.4 MG/DL (ref 2.5–4.5)
PLATELET # BLD AUTO: 211 10E3/UL (ref 150–450)
POTASSIUM BLD-SCNC: 3.7 MMOL/L (ref 3.5–5)
PROT SERPL-MCNC: 6.6 G/DL (ref 6–8)
RBC # BLD AUTO: 4.98 10E6/UL (ref 4.4–5.9)
SODIUM SERPL-SCNC: 140 MMOL/L (ref 136–145)
WBC # BLD AUTO: 7.3 10E3/UL (ref 4–11)

## 2022-04-14 PROCEDURE — 120N000001 HC R&B MED SURG/OB

## 2022-04-14 PROCEDURE — 82962 GLUCOSE BLOOD TEST: CPT

## 2022-04-14 PROCEDURE — 250N000012 HC RX MED GY IP 250 OP 636 PS 637: Performed by: FAMILY MEDICINE

## 2022-04-14 PROCEDURE — 250N000012 HC RX MED GY IP 250 OP 636 PS 637: Performed by: HOSPITALIST

## 2022-04-14 PROCEDURE — 250N000013 HC RX MED GY IP 250 OP 250 PS 637: Performed by: INTERNAL MEDICINE

## 2022-04-14 PROCEDURE — 258N000003 HC RX IP 258 OP 636: Performed by: FAMILY MEDICINE

## 2022-04-14 PROCEDURE — 83735 ASSAY OF MAGNESIUM: CPT | Performed by: FAMILY MEDICINE

## 2022-04-14 PROCEDURE — 99225 PR SUBSEQUENT OBSERVATION CARE,LEVEL II: CPT | Performed by: FAMILY MEDICINE

## 2022-04-14 PROCEDURE — 82010 KETONE BODYS QUAN: CPT | Performed by: FAMILY MEDICINE

## 2022-04-14 PROCEDURE — G0378 HOSPITAL OBSERVATION PER HR: HCPCS

## 2022-04-14 PROCEDURE — 36415 COLL VENOUS BLD VENIPUNCTURE: CPT | Performed by: FAMILY MEDICINE

## 2022-04-14 PROCEDURE — 85027 COMPLETE CBC AUTOMATED: CPT | Performed by: FAMILY MEDICINE

## 2022-04-14 PROCEDURE — 84100 ASSAY OF PHOSPHORUS: CPT | Performed by: FAMILY MEDICINE

## 2022-04-14 PROCEDURE — 82248 BILIRUBIN DIRECT: CPT | Performed by: FAMILY MEDICINE

## 2022-04-14 RX ORDER — ACETAMINOPHEN 500 MG
1000 TABLET ORAL EVERY 6 HOURS PRN
Status: DISCONTINUED | OUTPATIENT
Start: 2022-04-14 | End: 2022-04-15 | Stop reason: HOSPADM

## 2022-04-14 RX ADMIN — INSULIN ASPART 2 UNITS: 100 INJECTION, SOLUTION INTRAVENOUS; SUBCUTANEOUS at 17:20

## 2022-04-14 RX ADMIN — ACETAMINOPHEN 1000 MG: 500 TABLET ORAL at 21:08

## 2022-04-14 RX ADMIN — POTASSIUM CHLORIDE, DEXTROSE MONOHYDRATE AND SODIUM CHLORIDE: 150; 5; 450 INJECTION, SOLUTION INTRAVENOUS at 04:30

## 2022-04-14 RX ADMIN — INSULIN GLARGINE 24 UNITS: 100 INJECTION, SOLUTION SUBCUTANEOUS at 05:27

## 2022-04-14 RX ADMIN — INSULIN ASPART 2 UNITS: 100 INJECTION, SOLUTION INTRAVENOUS; SUBCUTANEOUS at 12:48

## 2022-04-14 RX ADMIN — INSULIN ASPART 3 UNITS: 100 INJECTION, SOLUTION INTRAVENOUS; SUBCUTANEOUS at 09:29

## 2022-04-14 RX ADMIN — SODIUM CHLORIDE: 9 INJECTION, SOLUTION INTRAVENOUS at 09:31

## 2022-04-14 ASSESSMENT — ACTIVITIES OF DAILY LIVING (ADL)
ADLS_ACUITY_SCORE: 9

## 2022-04-14 NOTE — PLAN OF CARE
Family education completed:N/A    Report given to: Melody JOHNSON.    Time of transfer: 1730    Transferred to: 2114    Belongings sent:Yes    Family updated: Attempted to call spouse with phone number in chart, was not able to reach. Number not in service.    Reviewed pertinent information from EPIC (EMAR/Clinical Summary/Flowsheets):Yes    Head-to-toe assessment with receiving RN:Yes    Recommendations (e.g. Family needs/recent issues/things to watch for): Have pt continue to administer his insulin.

## 2022-04-14 NOTE — UTILIZATION REVIEW
"Admission Status; Secondary Review Determination     Under the authority of the Utilization Management Committee, the utilization review process indicated a secondary review on Karla Quiñones.  The review outcome is based on review of the medical records, discussions with staff, and applying clinical experience noted on the date of the review.     (x) Observation Status Appropriate - This patient does not meet hospital inpatient criteria and is placed in observation status. If this patient's primary payer is Medicare and was admitted as an inpatient, Condition Code 44 should be used and patient status changed to \"observation\".     RATIONALE FOR DETERMINATION   42 yr old male with obesity and impaired glucose tolerance presented with hyperglycemia, N/V and polyuria.  Found in DKA.  Treated with insulin infusion overnight and gap closed.  No longer with N/V.  IVF for dehydration.  Now improved and starting Lantus.  Diabetic RN to see today and anticipating discharge.    The severity of illness, intensity of service provided, expected LOS and risk for adverse outcome make the care appropriate for further observation; however, doesn't meet criteria for hospital inpatient admission. Dr Juarez notified of this determination and agrees with downgrade of status.      The information on this document is developed by the utilization review team in order for the business office to ensure compliance.  This only denotes the appropriateness of proper admission status and does not reflect the quality of care rendered.         The definitions of Inpatient Status and Observation Status used in making the determination above are those provided in the CMS Coverage Manual, Chapter 1 and Chapter 6, section 70.4.      Sincerely,  Hyacinth Malik MD  Utilization Review  Physician Advisor  [facility]    "

## 2022-04-14 NOTE — PLAN OF CARE
VSS, no c/o pain, SOB or H/N/V. Insulin drip turned off at 0730, started on correction insulin. BG remains in the 200s and covered per MAR. Diabetes educator met with pt and provided education. Educated and had pt do a return demonstration for self-administering his insulin, pt did well and understood the steps. Appetite has been fair since this morning but tolerating fluids. Voiding adequately. Independent with cares.     Problem: Hyperglycemia  Goal: Blood Glucose Level Within Targeted Range  Outcome: Ongoing, Progressing     Problem: Plan of Care - These are the overarching goals to be used throughout the patient stay.    Goal: Optimal Comfort and Wellbeing  Outcome: Ongoing, Progressing

## 2022-04-14 NOTE — H&P
"[facility] MEDICINE ADMISSION HISTORY AND PHYSICAL     Assessment & Plan       Karla Quiñones is a 42 year old old male with history of obesity and impaired glucose tolerance presents with hyperglycemia associated with nausea vomiting and polyuria.      Diabetic ketoacidosis  Continue insulin drip with titration per protocol  IV fluid resuscitation  When gap closes transition to subcutaneous insulin  Would start with 24 units of Lantus  8 units of NovoLog prior to each meal with correction scale    Nausea and vomiting  Secondary to above  Resolved    Moderate to severe dehydration  Improved but still will require fluid resuscitation    Hyperkalemia  Due to acidosis  Likely developed hypokalemia  Protocol ordered    Hypophosphatemia  Protocol    Pseudohyponatremia  Due to hyperglycemia  Trend sodiums        # Hyperkalemia: K = 5.6 mmol/L (Ref range: 3.5 - 5.0 mmol/L) on admission, will monitor as appropriate     # Anion Gap Metabolic Acidosis: AG = 19 mmol/L (Ref range: 5 - 18 mmol/L) on admission, will monitor and treat as appropriate       # Diabetes, type II: last A1C 11.8 % (Ref range: <=5.6 %)  # Severe Obesity: Estimated body mass index is 40 kg/m  as calculated from the following:    Height as of this encounter: 1.905 m (6' 3\").    Weight as of this encounter: 145.2 kg (320 lb).        DVTP: Low Risk Patient   Code Status: Full Code  Disposition: Inpatient   Expected LOS: 2 days   Goals for the hospitalization: Resolution of DKA and hyperglycemia  Disposition Plan          The patient's care was discussed with the Bedside Nurse, Patient and ER provider.    Chief Complaint  nausea, vomiting, polyuria     HISTORY     Karla Quiñones is a 42 year old old male with h/o impaired glucose tolerance presented from the emergency room after he was found to be hyperglycemic.  He been having problems with nausea and vomiting and presented to urgent care where he had a CT scan of his abdomen and " pelvis.  He was found to have no acute abnormality on imaging but was found to have a blood sugar of 675 with an anion gap acidosis.  He was sent to the ER at [facility].  In the ER his blood sugar improved after some subcu insulin but was still quite elevated.  He still had anion gap and ketosis.  He was started on IV insulin drip.  He is feeling much better.  He still has an anion gap acidosis.  He is not vomiting.  He has no headache.  He is never been on anything but Metformin.  He does not check his blood sugars regularly.  He did note polydipsia and polyuria.  This is been going on for about 5 to 7 days.  He is really been unable to take anything by mouth for the last couple of days and would vomit just about anything that he would try to eat.    Past Medical History     Past Medical History:  No date: History of prediabetes      Comment:  A1c of 5.9% July 2020.  No date: Hypertension     Surgical History   No past surgical history on file.  Family History    Reviewed, and   Family History   Problem Relation Age of Onset     Obesity Mother         Social History      Social History     Tobacco Use     Smoking status: Never Smoker     Smokeless tobacco: Never Used   Substance Use Topics     Alcohol use: Yes     Drug use: Never      Works as a   Lives with his daughter    Allergies   No Known Allergies  Prior to Admission Medications      Prior to Admission Medications   Prescriptions Last Dose Informant Patient Reported? Taking?   metFORMIN (GLUCOPHAGE-XR) 500 MG 24 hr tablet 4/11/2022 at AM  Yes Yes   Sig: Take 500 mg by mouth daily (with breakfast)      Facility-Administered Medications: None      Review of Systems     A 12 point comprehensive review of systems was negative except as noted above in HPI.    PHYSICAL EXAMINATION       Vitals      Temp:  [98.3  F (36.8  C)] 98.3  F (36.8  C)  Pulse:  [] 100  Resp:  [17-20] 18  BP: (129-158)/(61-97) 129/63  SpO2:  [94 %-99 %] 98  %    Examination     GENERAL:  Alert, appears comfortable, in no acute distress, appears stated age   HEAD:  Normocephalic, without obvious abnormality, atraumatic   EYES:  PERRL, conjunctiva/corneas clear, no scleral icterus, EOM's intact   NOSE: Nares normal, septum midline, mucosa normal, no drainage   THROAT: Lips, mucosa, and tongue normal; teeth and gums normal, mouth dry   NECK: Supple, symmetrical, trachea midline   BACK:   Symmetric, no curvature, ROM normal   LUNGS:   Clear to auscultation bilaterally, no rales, rhonchi, or wheezing, symmetric chest rise on inhalation, respirations unlabored   CHEST WALL:  No tenderness or deformity   HEART:  Regular rate and rhythm, S1 and S2 normal, no murmur, rub, or gallop    ABDOMEN:   Soft, non-tender, bowel sounds active all four quadrants, no masses, no organomegaly, no rebound or guarding   EXTREMITIES: Extremities normal, atraumatic, no cyanosis or edema    SKIN: Dry to touch, no exanthems in the visualized areas   NEURO: Alert, oriented x 4, moves all four extremities freely, non-focal   PSYCH: Cooperative, behavior is appropriate      Pertinent Lab     Most Recent 3 CBC's:Recent Labs   Lab Test 04/13/22  1605   WBC 9.0   HGB 17.2   MCV 87        Most Recent 3 BMP's:Recent Labs   Lab Test 04/13/22  1829 04/13/22  1719 04/13/22  1605 04/13/22  1456 10/06/21  1411   NA  --   --  135*  --  143   POTASSIUM  --   --  5.6*  --  3.6   CHLORIDE  --   --  99  --  109   CO2  --   --  17*  --  25   BUN  --   --  23*  --  14   CR  --   --  1.75*  --  1.12   ANIONGAP  --   --  19*  --  9   WILLEM  --   --  9.9  --  9.2   * 358* 460*   < > 122*    < > = values in this interval not displayed.     Most Recent 2 LFT's:Recent Labs   Lab Test 04/13/22  1605 10/06/21  1411   AST 29 30   ALT 43 43   ALKPHOS 106 89   BILITOTAL 0.9 0.6     Most Recent 3 INR's:No lab results found.      Pertinent Radiology     Radiology Results: No results found for this or any previous  visit (from the past 24 hour(s)).  EKG Results: personally reviewed.     Advance Care Planning        Aj Juarez MD  [facility]   Phone: #[phone]

## 2022-04-14 NOTE — PROGRESS NOTES
Care Management Discharge Note    Discharge Date: 04/14/2022       Discharge Disposition: Home    Discharge Services: None    Discharge DME: None    Discharge Transportation: family or friend will provide    Private pay costs discussed: Not applicable    Education Provided on the Discharge Plan:    Persons Notified of Discharge Plans: MD has placed orders  Patient/Family in Agreement with the Plan: yes    Handoff Referral Completed: Yes    Additional Information:      No anticipated CM needs.  Family will provide transport home at discharge.         TEVIN SaucedaSW

## 2022-04-14 NOTE — PLAN OF CARE
Shift Note: 2040-0730    Pt arrived to unit from ED around 2040.     On insulin drip overnight, stayed from algorithm 1-3.  Blood sugar went under 150 at 0436, MD notified and subcutaneous lantus was ordered and given at 0527.  Insulin infusion will be stopped at 0727 per protocol.    Pt denied nausea overnight and has been tolerating ice chips and water.     Anion gap closed (went from 19 to 12), potassium in normal range,  And ketones decreased to 0.54 from 4.23.     Neuro checks were WDL.     Problem: Diabetic Ketoacidosis  Goal: Fluid and Electrolyte Balance with Absence of Ketosis  Outcome: Ongoing, Progressing     Problem: Hyperglycemia  Goal: Blood Glucose Level Within Targeted Range  Outcome: Ongoing, Progressing   Goal Outcome Evaluation:

## 2022-04-14 NOTE — CONSULTS
Care Management Initial Consult    General Information  Assessment completed with: Patient,    Type of CM/SW Visit: Initial Assessment  Primary Care Provider verified and updated as needed: Yes   Readmission within the last 30 days: no previous admission in last 30 days   Reason for Consult: discharge planning, health care directive, transportation  Advance Care Planning: Advance Care Planning Reviewed: no concerns identified        Communication Assessment  Patient's communication style: spoken language (English or Bilingual)    Hearing Difficulty or Deaf: no   Wear Glasses or Blind: yes    Cognitive  Cognitive/Neuro/Behavioral:                        Living Environment:   People in home: child(carol), dependent (One child. Family is currently caring for the child.)     Current living Arrangements: apartment      Able to return to prior arrangements: yes     Family/Social Support:  Care provided by: self  Provides care for: child(carol)  Marital Status: Single  Other (specify) (Pt stated sufficient family support.)          Description of Support System: Supportive, Involved    Support Assessment: Adequate family and caregiver support, Adequate social supports    Current Resources:   Patient receiving home care services: No  Community Resources: None  Equipment currently used at home: none  Supplies currently used at home: None    Employment/Financial:  Employment Status: employed full-time     Financial Concerns: No concerns identified   Referral to Financial Worker: No     Lifestyle & Psychosocial Needs:  Social Determinants of Health     Tobacco Use: Low Risk      Smoking Tobacco Use: Never Smoker     Smokeless Tobacco Use: Never Used   Alcohol Use: Not on file   Financial Resource Strain: Not on file   Food Insecurity: Not on file   Transportation Needs: Not on file   Physical Activity: Not on file   Stress: Not on file   Social Connections: Not on file   Intimate Partner Violence: Not on file   Depression: Not on  "file   Housing Stability: Not on file     Functional Status:  Prior to admission patient needed assistance:   Dependent ADLs:: Independent  Dependent IADLs:: Independent  Assessment of Functional Status: At functional baseline    Mental Health Status:  Mental Health Status: No Current Concerns       Chemical Dependency Status:  Chemical Dependency Status: No Current Concerns           Values/Beliefs:  Spiritual, Cultural Beliefs, Synagogue Practices, Values that affect care: no             Additional Information:  Writer met with pt to introduce Care Management service(CM) and obtain an initial assessment. Pt states sharing a home with a child. Pt did not specify gender or age. Family is caring for child. Pt states he is totally independent with I/ADLs at baseline. No in-home or other services stated. No stated DME utilized. Pt denies the need for CM involvement.    4/13 per CALEB Matute -\"42 year old old male with h/o impaired glucose tolerance presented from the emergency room after he was found to be hyperglycemic.  He been having problems with nausea and vomiting and presented to urgent care where he had a CT scan of his abdomen and pelvis.  He was found to have no acute abnormality on imaging but was found to have a blood sugar of 675 with an anion gap acidosis.  He was sent to the ER at [facility].  In the ER his blood sugar improved after some subcu insulin but was still quite elevated.  He still had anion gap and ketosis.  He was started on IV insulin drip.  He is feeling much better.  He still has an anion gap acidosis.  He is not vomiting.  He has no headache.  He is never been on anything but Metformin.  He does not check his blood sugars regularly.  He did note polydipsia and polyuria.  This is been going on for about 5 to 7 days.  He is really been unable to take anything by mouth for the last couple of days and would vomit just about anything that he would try to eat.\"     DIABETIC EDU Consult " pending. No anticipated CM needs. Pt stated family will provide transport at discharge without issue. CM to follow and assist with discharge planning as needed.    Johan Holloway RN

## 2022-04-14 NOTE — ED NOTES
Pt report called to Lissa JOHNSON on 3rd floor room 312  Pertinent information such as applicable labs, vitals and radiological studies given to aforementioned nurse.  RN has no questions at this time.  Floor nurse is ready for pt and transport is being arranged.

## 2022-04-14 NOTE — PROGRESS NOTES
[facility] MEDICINE PROGRESS NOTE      Patient is doing quite well this morning.  Off of drip and now starting long-acting insulin.  Plan is for diabetic education and possible discharge this evening.  He is in agreement with this and is hopeful to leave.  No more nausea vomiting etc.  Exam is benign.

## 2022-04-14 NOTE — CONSULTS
DIABETES CARE  Consulted by Provider for Diabetes Education    42 year old male with type 2 diabetes. Patient was admitted for hyperglycemia with nausea, vomiting and polyuria. .  Related Co-morbidities include: Obesity    PCP: Carmella Alvarado MD  Social:Lives with spouse and chile in apartment.    Nutrition & Diabetes History: Prediabetes in October with 5.9 A1c    Meds for BG Management PTA:  Metformin XR 500mg once daily      Current Inpatient Meds for BG Management:  Novolog 8 units with meals  -Novolog  Medium correction scale 1 drops 50 mg/dl  -Lantus 24 units in am.     Labs:  Hemoglobin A1C:11.8         Hgb:17.2 SCr:1.17  GFR:79   BGs:   Latest Reference Range & Units 04/14/22 06:23 04/14/22 07:41 04/14/22 08:35   GLUCOSE BY METER POCT 70 - 99 mg/dL 156 (H) 160 (H) 272 (H)     Diet Order:  60 grams CHO Intake: 100%  Efeylm549.5 kg  BMI: 38.72    DM EDUCATION/COUNSELING:  Barriers to Learning and/or DM Self-Management: none known  Previous DM Education:   no  Current Education and/or visit with Patient and/or caregiver(s):  Reviewed diabetes disease with patient . Discussed current blood sugars/A1C and target/goal numbers. Reviewed importance of checking blood glucose levels and keeping log and/or bringing meter to all f/u visits. Reviewed hypoglycemia/hyperglycemia symptoms and risks of complications with continued high blood glucose. Educated on how to treat low Bg. Reviewed when to call/who to call.     Discussed diet and exercise as important components to good BG control. Briefly reviewed diabetic diet including what foods have cho and portions. Also instructed on exercise guidelines with diabetes.      Reviewed/Instructed patient on  insulin pen use. Medications were explained, including how they each acted on blood sugar, their timing and differences in dosing. The insulin pen was demonstrated by patient. Also discussed site rotation, proper storage and safe needle disposal.     Provided pt with  "Contour Next one meter. Instructed on how to use this and patient able to demonstrate use.    (See also \"Diabetic Ed Flowsheet\"  Or Education tab-diabetes for any education topic details.)      RECOMMENDATIONS:  Start with using at least basal insulin, and either meal insulin or other orals.  His metformin could be increased.     For inpatient diabetes management guidelines refer to \"Guidelines for Subcutaneous Insulin Dosing\" found in the DIAB Subcutaneous Insulin Management Adult order set.       DISCHARGE NEEDS:  1. Contour Next  strips, 2 boxes of 50  2. Microlet lancets, box of 100  E11.65   To test BG twice daily  Insulin requiring  Lantus solostar pens, pack of 5  Pen needles 32 gauge x 4mm, 1-2 boxes  Novolog flex pens if needed, pack of 5         Thank you,   Milena Dorantes RN, [facility]  Diabetes Care   Pager: [phone]             "

## 2022-04-15 VITALS
HEIGHT: 75 IN | OXYGEN SATURATION: 96 % | DIASTOLIC BLOOD PRESSURE: 70 MMHG | WEIGHT: 309.8 LBS | TEMPERATURE: 98.5 F | HEART RATE: 93 BPM | SYSTOLIC BLOOD PRESSURE: 136 MMHG | RESPIRATION RATE: 16 BRPM | BODY MASS INDEX: 38.52 KG/M2

## 2022-04-15 LAB
ANION GAP SERPL CALCULATED.3IONS-SCNC: 11 MMOL/L (ref 5–18)
BUN SERPL-MCNC: 14 MG/DL (ref 8–22)
CALCIUM SERPL-MCNC: 9 MG/DL (ref 8.5–10.5)
CHLORIDE BLD-SCNC: 104 MMOL/L (ref 98–107)
CO2 SERPL-SCNC: 22 MMOL/L (ref 22–31)
CREAT SERPL-MCNC: 1.16 MG/DL (ref 0.7–1.3)
GFR SERPL CREATININE-BSD FRML MDRD: 80 ML/MIN/1.73M2
GLUCOSE BLD-MCNC: 293 MG/DL (ref 70–125)
GLUCOSE BLDC GLUCOMTR-MCNC: 240 MG/DL (ref 70–99)
GLUCOSE BLDC GLUCOMTR-MCNC: 272 MG/DL (ref 70–99)
GLUCOSE BLDC GLUCOMTR-MCNC: 294 MG/DL (ref 70–99)
KETONES BLD-SCNC: 1.15 MMOL/L
POTASSIUM BLD-SCNC: 4 MMOL/L (ref 3.5–5)
SODIUM SERPL-SCNC: 137 MMOL/L (ref 136–145)

## 2022-04-15 PROCEDURE — 99217 PR OBSERVATION CARE DISCHARGE: CPT | Performed by: FAMILY MEDICINE

## 2022-04-15 PROCEDURE — 82010 KETONE BODYS QUAN: CPT | Performed by: INTERNAL MEDICINE

## 2022-04-15 PROCEDURE — 36415 COLL VENOUS BLD VENIPUNCTURE: CPT | Performed by: FAMILY MEDICINE

## 2022-04-15 PROCEDURE — G0378 HOSPITAL OBSERVATION PER HR: HCPCS

## 2022-04-15 PROCEDURE — 82962 GLUCOSE BLOOD TEST: CPT

## 2022-04-15 PROCEDURE — 80048 BASIC METABOLIC PNL TOTAL CA: CPT | Performed by: FAMILY MEDICINE

## 2022-04-15 RX ORDER — LANCETS
EACH MISCELLANEOUS
Qty: 1 EACH | Refills: 3 | Status: SHIPPED | OUTPATIENT
Start: 2022-04-15

## 2022-04-15 RX ORDER — GLUCOSAMINE HCL/CHONDROITIN SU 500-400 MG
CAPSULE ORAL
Qty: 100 EACH | Refills: 3 | Status: SHIPPED | OUTPATIENT
Start: 2022-04-15

## 2022-04-15 RX ORDER — METFORMIN HCL 500 MG
1000 TABLET, EXTENDED RELEASE 24 HR ORAL
Qty: 60 TABLET | Refills: 0 | Status: SHIPPED | OUTPATIENT
Start: 2022-04-15

## 2022-04-15 RX ADMIN — INSULIN ASPART 4 UNITS: 100 INJECTION, SOLUTION INTRAVENOUS; SUBCUTANEOUS at 09:08

## 2022-04-15 NOTE — PLAN OF CARE
Discharge paperwork and teaching with patient has been completed. RN gave adequate time to answer questions and patient verbally stated they understand information.     Shea Gallegos RN

## 2022-04-15 NOTE — PROGRESS NOTES
Cross cover note:  Pt requesting tylenol for headache ordered. DKA improved so d/bc tele perRN's request. Blood sugars in the 250s so increased AM lantus for 24 to 30units daily  Nicci Duron MD   Pager [phone]

## 2022-04-15 NOTE — PLAN OF CARE
Goal: Optimal Comfort and Wellbeing  Outcome: Ongoing, Progressing   Goal Outcome Evaluation:        Patient's headache has resolved after receiving one dose of Tylenol at HS. Assessment WNL. Nausea and vomiting have resolved. Patient voiding WNL, up independently in his room.      Problem: Hyperglycemia  Goal: Blood Glucose Level Within Targeted Range  Outcome: Ongoing, Progressing     Blood sugar at , 2 units Novolog given. Scheduled morning dose of Lantus increased by Dr. Duron, see new order.

## 2022-04-15 NOTE — DISCHARGE SUMMARY
[facility] MEDICINE  DISCHARGE SUMMARY     Primary Care Physician: Carmella Alvarado  Admission Date: 4/13/2022   Discharge Provider: Aj Juarez MD Discharge Date: 4/15/2022   Diet:   Active Diet and Nourishment Order   Procedures     Moderate Consistent Carb (60 g CHO per Meal) Diet     Diet       Code Status: Full Code   Activity: DCACTIVITY: Activity as tolerated        Condition at Discharge: Stable     REASON FOR PRESENTATION(See Admission Note for Details)   Elevated blood sugar with nausea and vomiting    PRINCIPAL & ACTIVE DISCHARGE DIAGNOSES     Active Problems:    HTN (hypertension)    DKA (diabetic ketoacidosis) (H)    Hyperglycemia    Diabetic ketoacidosis without coma associated with type 2 diabetes mellitus (H)    JULISSA (acute kidney injury) (H)    Hyperkalemia    Diabetes mellitus, new onset (H)      PENDING LABS     Unresulted Labs Ordered in the Past 30 Days of this Admission     No orders found from 3/14/2022 to 4/14/2022.            PROCEDURES ( this hospitalization only)          RECOMMENDATIONS TO OUTPATIENT PROVIDER FOR F/U VISIT     Follow-up Appointments     Follow-up and recommended labs and tests       Follow up with primary care provider, Carmella Alvarado, within 7 days for   hospital follow- up.  The following labs/tests are recommended: diabetic   check, bmp and referral to diabetic education.             DISPOSITION     Home    SUMMARY OF HOSPITAL COURSE:    HPI: 42-year-old male with history of impaired glucose tolerance presented to the hospital from urgent care after being found to be hyperglycemic.  In the ER he was found to be in diabetic ketoacidosis and admitted for further evaluation and treatment.      DKA/type 2 diabetes with hyperglycemia  Patient was admitted and started on insulin drip and DKA protocol in the ICU.  He was able to transition off the drip and was started on long-acting insulin with premeal scheduled and correction.   He remained hyperglycemic but had improved blood sugars at the time of discharge and was requesting discharge from the hospital.  The patient will use 30 units of Lantus in the a.m.  He will take 12 units of NovoLog prior to meals with correction sliding scale.  Signs and symptoms of hypoglycemia discussed with the patient.  Follow-up with primary soon as possible for continued titration of insulin/medication adjustment and diabetic education/endocrinology referral.  Acidosis resolved and gap was closed at discharge.    Moderate to severe dehydration/acute kidney injury  Patient was aggressively hydrated and volume status returned to normal.  Creatinine on admission was 1.75 and is 1.16 at discharge.  Recommend close monitoring as an outpatient.    Hyperkalemia/pseudohyponatremia  Both resolved with hydration and resolution of DKA.    Hypophosphatemia  Repleted prior to discharge      Discharge Medications with Med changes:     Current Discharge Medication List      START taking these medications    Details   alcohol swab prep pads Use to swab area of injection/amando as directed  Qty: 100 each, Refills: 3    Associated Diagnoses: Type 2 diabetes mellitus with hyperglycemia, with long-term current use of insulin (H)      blood glucose (NO BRAND SPECIFIED) test strip Use to test blood sugar 4 times daily or as directed. To accompany: Blood Glucose Monitor Brands: per insurance.  Qty: 100 strip, Refills: 6    Associated Diagnoses: Type 2 diabetes mellitus with hyperglycemia, with long-term current use of insulin (H)      !! insulin aspart (NOVOLOG PEN) 100 UNIT/ML pen Inject 1-10 Units Subcutaneous 3 times daily (before meals) Correction Scale - HIGH INSULIN RESISTANCE DOSING     Do Not give Correction Insulin if Pre-Meal BG less than 140.   For Pre-Meal  - 164 give 1 unit.   For Pre-Meal  - 189 give 2 units.   For Pre-Meal  - 214 give 3 units.   For Pre-Meal  - 239 give 4 units.   For Pre-Meal   - 264 give 5 units.   For Pre-Meal  - 289 give 6 units.   For Pre-Meal  - 314 give 7 units.   For Pre-Meal  - 339 give 8 units.   For Pre-Meal  - 364 give 9 units.   For Pre-Meal BG greater than or equal to 365 give 10 units  To be given with prandial insulin, and based on pre-meal blood glucose.   Notify provider if glucose greater than or equal to 350 mg/dL after administration of correction dose.  If given at mealtime, administer within 30 minutes of start of meal.  Qty: 15 mL, Refills: 3    Associated Diagnoses: Type 2 diabetes mellitus with hyperglycemia, with long-term current use of insulin (H)      !! insulin aspart (NOVOLOG PEN) 100 UNIT/ML pen 12 units subcutaneously prior to breakfast lunch and dinner  Qty: 15 mL, Refills: 3    Associated Diagnoses: Type 2 diabetes mellitus with hyperglycemia, with long-term current use of insulin (H)      insulin glargine (LANTUS PEN) 100 UNIT/ML pen Inject 30 Units Subcutaneous every morning (before breakfast)  Qty: 15 mL, Refills: 3    Comments: If Lantus is not covered by insurance, may substitute Basaglar or Semglee or other insulin glargine product per insurance preference at same dose and frequency.    Associated Diagnoses: Type 2 diabetes mellitus with hyperglycemia, with long-term current use of insulin (H)      ondansetron (ZOFRAN ODT) 4 MG ODT tab Take 1 tablet (4 mg) by mouth every 8 hours as needed for nausea  Qty: 20 tablet, Refills: 0      thin (NO BRAND SPECIFIED) lancets Use to test blood sugar 4 times daily or as directed. To accompany: Blood Glucose Monitor Brands: per insurance.  Qty: 1 each, Refills: 3    Associated Diagnoses: Type 2 diabetes mellitus with hyperglycemia, with long-term current use of insulin (H)       !! - Potential duplicate medications found. Please discuss with provider.      CONTINUE these medications which have CHANGED    Details   metFORMIN (GLUCOPHAGE-XR) 500 MG 24 hr tablet Take 2 tablets (1,000  mg) by mouth daily (with breakfast)  Qty: 60 tablet, Refills: 0    Associated Diagnoses: Diabetic ketoacidosis without coma associated with type 2 diabetes mellitus (H)                   Rationale for medication changes:      New onset of insulin-dependent diabetes        Consults     DIABETES EDUCATION IP CONSULT  SOCIAL WORK IP CONSULT  DIABETES EDUCATION IP CONSULT  NUTRITION SERVICES ADULT IP CONSULT    Immunizations given this encounter     Most Recent Immunizations   Administered Date(s) Administered     COVID-19,PF,Moderna 03/04/2021     Tdap (Adacel,Boostrix) 06/01/2012           Anticoagulation Information      Recent INR results: No results for input(s): INR in the last 168 hours.  Warfarin doses (if applicable) or name of other anticoagulant:       SIGNIFICANT IMAGING FINDINGS     No results found for this visit on 04/13/22.    SIGNIFICANT LABORATORY FINDINGS     Most Recent 3 CBC's:Recent Labs   Lab Test 04/14/22 0413 04/13/22  1605   WBC 7.3 9.0   HGB 14.7 17.2   MCV 88 87    269     Most Recent 3 BMP's:Recent Labs   Lab Test 04/15/22  0848 04/15/22  0529 04/15/22  0305 04/14/22  0436 04/14/22  0413 04/13/22  2310 04/13/22  2242 04/13/22  1719 04/13/22  1605   NA  --  137  --   --  140  --   --   --  135*   POTASSIUM  --  4.0  --   --  3.7  --  4.2  --  5.6*   CHLORIDE  --  104  --   --  111*  --   --   --  99   CO2  --  22  --   --  17*  --   --   --  17*   BUN  --  14  --   --  16  --   --   --  23*   CR  --  1.16  --   --  1.17  --   --   --  1.75*   ANIONGAP  --  11  --   --  12  --   --   --  19*   WILLEM  --  9.0  --   --  9.0  --   --   --  9.9   * 293* 272*   < > 152*   < >  --    < > 460*    < > = values in this interval not displayed.     Most Recent 2 LFT's:Recent Labs   Lab Test 04/14/22 0413 04/13/22  1605   AST 23 29   ALT 29 43   ALKPHOS 80 106   BILITOTAL 1.0 0.9     Most Recent 3 INR's:No lab results found.      Discharge Orders        Primary Care Referral      Reason for  your hospital stay    Diabetes with very high blood sugars     Follow-up and recommended labs and tests     Follow up with primary care provider, Carmella Alvarado, within 7 days for hospital follow- up.  The following labs/tests are recommended: diabetic check, bmp and referral to diabetic education.     Activity    Your activity upon discharge: activity as tolerated     Monitor and record    blood glucose 4 times a day, before meals and at bedtime     Diet    Follow this diet upon discharge: Orders Placed This Encounter      Moderate Consistent Carb (60 g CHO per Meal) Diet       Examination   Physical Exam   Temp:  [97.5  F (36.4  C)-98.7  F (37.1  C)] 98.5  F (36.9  C)  Pulse:  [85-99] 93  Resp:  [16-18] 16  BP: (134-148)/(63-84) 136/70  SpO2:  [94 %-99 %] 96 %  Wt Readings from Last 1 Encounters:   04/14/22 140.5 kg (309 lb 12.8 oz)       General Appearance: No apparent distress  Respiratory: Clear to auscultation bilaterally  Cardiovascular: Regular rate and rhythm  GI: Abdomen is soft and nontender with positive bowel sounds  Skin: Visualized skin is normal  Other: Good eye contact normal affect requesting discharge      Please see EMR for more detailed significant labs, imaging, consultant notes etc.    I, Aj Juarez MD, personally saw the patient today and spent greater than 30 minutes discharging this patient.    Aj Juarez MD  [facility]    CC:Carmella Alvarado

## 2022-04-15 NOTE — PROGRESS NOTES
"[facility] MEDICINE PROGRESS NOTE    Late entry    Identification/Summary: Karla Quiñones is a 42 year old male with a past medical history of obesity and impaired glucose tolerance who presented with hyperglycemia and new onset of diabetes with DKA.    Assessment and Plan:  DKA/hyperglycemia  Resolved  Patient has been seen by diabetic education  Started on Lantus and scheduled premeal insulin with correction  Was going to discharge today  Due to persistent hyperglycemia elected to stay in the hospital  Increase Lantus dosing  Likely discharge 4/15    Acute kidney injury  Resolved    Moderate to severe dehydration  Resolved    Hyperkalemia  Resolved    Pseudohyponatremia                  # Diabetes, type II: last A1C 11.8 % (Ref range: <=5.6 %)  # Obesity: Estimated body mass index is 38.72 kg/m  as calculated from the following:    Height as of this encounter: 1.905 m (6' 3\").    Weight as of this encounter: 140.5 kg (309 lb 12.8 oz).      Diet: Moderate Consistent Carb (60 g CHO per Meal) Diet  DVT Prophylaxis:  {Ambulation  Code Status: Full Code    Anticipated possible discharge in 1 day    Disposition Plan   Expected Discharge: 04/15/2022     Anticipated discharge location: home         The patient's care was discussed with the Bedside Nurse, Care Coordinator/ and Patient.    Aj Juarez MD  [facility]  [facility] Medicine  [facility]  Phone: #[phone]    Interval History/Subjective:  Feeling much better.  Still concerned about blood sugars.  No nausea vomiting chest pain diarrhea etc.    Physical Exam/Objective:  Temp:  [97.5  F (36.4  C)-98.7  F (37.1  C)] 97.9  F (36.6  C)  Pulse:  [85-99] 85  Resp:  [16-18] 16  BP: (134-148)/(63-84) 148/84  SpO2:  [94 %-99 %] 99 %  Body mass index is 38.72 kg/m .    GENERAL:  Alert, appears comfortable, in no acute distress, appears stated age   HEAD:  Normocephalic, without obvious abnormality, " atraumatic   EYES:  PERRL, conjunctiva/corneas clear, no scleral icterus, EOM's intact   NOSE: Nares normal, septum midline, mucosa normal, no drainage   THROAT: Lips, mucosa, and tongue normal; teeth and gums normal, mouth moist   NECK: Supple, symmetrical, trachea midline   BACK:   Symmetric, no curvature, ROM normal   LUNGS:   Clear to auscultation bilaterally, no rales, rhonchi, or wheezing, symmetric chest rise on inhalation, respirations unlabored   CHEST WALL:  No tenderness or deformity   HEART:  Regular rate and rhythm, S1 and S2 normal, no murmur, rub, or gallop    ABDOMEN:   Soft, non-tender, bowel sounds active all four quadrants, no masses, no organomegaly, no rebound or guarding   EXTREMITIES: Extremities normal, atraumatic, no cyanosis or edema    SKIN: Dry to touch, no exanthems in the visualized areas   NEURO: Alert, oriented x3, moves all four extremities freely   PSYCH: Cooperative, behavior is appropriate      Data reviewed today: I personally reviewed all new medications, labs, imaging/diagnostics reports over the past 24 hours. Pertinent findings include:    Imaging:   No results found for this or any previous visit (from the past 24 hour(s)).    Labs:  Most Recent 3 CBC's:Recent Labs   Lab Test 04/14/22  0413 04/13/22  1605   WBC 7.3 9.0   HGB 14.7 17.2   MCV 88 87    269     Most Recent 3 BMP's:Recent Labs   Lab Test 04/15/22  0529 04/15/22  0305 04/14/22  2131 04/14/22  0436 04/14/22  0413 04/13/22  2310 04/13/22  2242 04/13/22  1719 04/13/22  1605     --   --   --  140  --   --   --  135*   POTASSIUM 4.0  --   --   --  3.7  --  4.2  --  5.6*   CHLORIDE 104  --   --   --  111*  --   --   --  99   CO2 22  --   --   --  17*  --   --   --  17*   BUN 14  --   --   --  16  --   --   --  23*   CR 1.16  --   --   --  1.17  --   --   --  1.75*   ANIONGAP 11  --   --   --  12  --   --   --  19*   WILLEM 9.0  --   --   --  9.0  --   --   --  9.9   * 272* 265*   < > 152*   < >  --    <  > 460*    < > = values in this interval not displayed.       Medications:   Personally Reviewed.  Medications       insulin aspart  12 Units Subcutaneous Daily with lunch     insulin aspart  12 Units Subcutaneous Daily with breakfast     insulin aspart  12 Units Subcutaneous Daily with supper     insulin aspart  1-7 Units Subcutaneous TID AC     insulin aspart  1-5 Units Subcutaneous At Bedtime     insulin glargine  30 Units Subcutaneous QAM AC     sodium chloride (PF)  3 mL Intracatheter Q8H

## 2022-04-16 ENCOUNTER — PATIENT OUTREACH (OUTPATIENT)
Dept: CARE COORDINATION | Facility: CLINIC | Age: 42
End: 2022-04-16
Payer: COMMERCIAL

## 2022-04-16 DIAGNOSIS — Z71.89 OTHER SPECIFIED COUNSELING: ICD-10-CM

## 2022-04-16 NOTE — PROGRESS NOTES
Clinic Care Coordination Contact  [facility]/Voicemail       Clinical Data: Care Coordinator Outreach  Outreach attempted x 1.  Left message on patient's voicemail with call back information and requested return call.  Plan: Care Coordinator will try to reach patient again in 1-2 business days.        EMIGDIO Abel  [phone]  [facility]

## 2022-04-18 NOTE — PROGRESS NOTES
"Clinic Care Coordination Contact  [facility]: Post-Discharge Note  SITUATION                                                      Admission:    Admission Date: 04/13/22   Reason for Admission: Elevated blood sugar with nausea and vomiting  Discharge:   Discharge Date: 04/15/22  Discharge Diagnosis: HTN (hypertension)    DKA (diabetic ketoacidosis)    BACKGROUND                                                      Per hospital discharge summary and inpatient provider notes:  42-year-old male with history of impaired glucose tolerance presented to the hospital from urgent care after being found to be hyperglycemic.  In the ER he was found to be in diabetic ketoacidosis and admitted for further evaluation and treatment.       ASSESSMENT      Enrollment  Primary Care Care Coordination Status: Not a Candidate    Discharge Assessment  How are you doing now that you are home?: \" I'm doing okay\"  How are your symptoms? (Red Flag symptoms escalate to triage hotline per guidelines): Improved  Do you feel your condition is stable enough to be safe at home until your provider visit?: Yes  Does the patient have their discharge instructions? : Yes  Does the patient have questions regarding their discharge instructions? : No  Were you started on any new medications or were there changes to any of your previous medications? : Yes  Does the patient have all of their medications?: Yes  Do you have questions regarding any of your medications? : No  Do you have all of your needed medical supplies or equipment (DME)?  (i.e. oxygen tank, CPAP, cane, etc.): Yes  Discharge follow-up appointment scheduled within 14 calendar days? : Yes  Discharge Follow Up Appointment Date: 04/25/22  Discharge Follow Up Appointment Scheduled with?: Primary Care Provider    Post-op ([facility] Only)  If the patient had a surgery or procedure, do they have any questions for a nurse?: No             PLAN                                                  "     Outpatient Plan:    No future appointments.  Follow up with primary care provider, Carmella Alvarado, within 7 days for hospital follow- up.  The following labs/tests are recommended: diabetic check, bmp and referral to diabetic education.          Activity     Your activity upon discharge: activity as tolerated          Monitor and record     blood glucose 4 times a day, before meals and at bedtime          Diet     Follow this diet upon discharge: Orders Placed This Encounter      Moderate Consistent Carb (60 g CHO per Meal) Diet           For any urgent concerns, please contact our 24 hour nurse triage line: [phone] ([phone])         Lizabeth Moore MA

## 2022-04-25 ENCOUNTER — LAB REQUISITION (OUTPATIENT)
Dept: LAB | Facility: CLINIC | Age: 42
End: 2022-04-25

## 2022-04-25 DIAGNOSIS — E11.9 TYPE 2 DIABETES MELLITUS WITHOUT COMPLICATIONS (H): ICD-10-CM

## 2022-04-25 DIAGNOSIS — Z79.4 LONG TERM (CURRENT) USE OF INSULIN (H): ICD-10-CM

## 2022-04-25 LAB
CREAT UR-MCNC: 164 MG/DL
MICROALBUMIN UR-MCNC: <5 MG/L
MICROALBUMIN/CREAT UR: NORMAL MG/G{CREAT}

## 2022-04-25 PROCEDURE — 82043 UR ALBUMIN QUANTITATIVE: CPT | Performed by: FAMILY MEDICINE

## 2022-07-17 ENCOUNTER — HEALTH MAINTENANCE LETTER (OUTPATIENT)
Age: 42
End: 2022-07-17

## 2022-09-12 ENCOUNTER — LAB REQUISITION (OUTPATIENT)
Dept: LAB | Facility: CLINIC | Age: 42
End: 2022-09-12
Payer: COMMERCIAL

## 2022-09-12 DIAGNOSIS — I10 ESSENTIAL (PRIMARY) HYPERTENSION: ICD-10-CM

## 2022-09-12 DIAGNOSIS — E11.9 TYPE 2 DIABETES MELLITUS WITHOUT COMPLICATIONS (H): ICD-10-CM

## 2022-09-12 DIAGNOSIS — Z79.4 LONG TERM (CURRENT) USE OF INSULIN (H): ICD-10-CM

## 2022-09-12 LAB
ALBUMIN SERPL BCG-MCNC: 4.2 G/DL (ref 3.5–5.2)
ALP SERPL-CCNC: 69 U/L (ref 40–129)
ALT SERPL W P-5'-P-CCNC: 48 U/L (ref 10–50)
ANION GAP SERPL CALCULATED.3IONS-SCNC: 9 MMOL/L (ref 7–15)
AST SERPL W P-5'-P-CCNC: 37 U/L (ref 10–50)
BILIRUB SERPL-MCNC: 1 MG/DL
BUN SERPL-MCNC: 17.4 MG/DL (ref 6–20)
CALCIUM SERPL-MCNC: 9.4 MG/DL (ref 8.6–10)
CHLORIDE SERPL-SCNC: 107 MMOL/L (ref 98–107)
CHOLEST SERPL-MCNC: 201 MG/DL
CREAT SERPL-MCNC: 1.04 MG/DL (ref 0.67–1.17)
DEPRECATED HCO3 PLAS-SCNC: 26 MMOL/L (ref 22–29)
GFR SERPL CREATININE-BSD FRML MDRD: >90 ML/MIN/1.73M2
GLUCOSE SERPL-MCNC: 70 MG/DL (ref 70–99)
HDLC SERPL-MCNC: 62 MG/DL
LDLC SERPL CALC-MCNC: 122 MG/DL
NONHDLC SERPL-MCNC: 139 MG/DL
POTASSIUM SERPL-SCNC: 3.8 MMOL/L (ref 3.4–5.3)
PROT SERPL-MCNC: 7.2 G/DL (ref 6.4–8.3)
SODIUM SERPL-SCNC: 142 MMOL/L (ref 136–145)
TRIGL SERPL-MCNC: 87 MG/DL

## 2022-09-12 PROCEDURE — 80061 LIPID PANEL: CPT | Mod: ORL | Performed by: FAMILY MEDICINE

## 2022-09-12 PROCEDURE — 80053 COMPREHEN METABOLIC PANEL: CPT | Performed by: FAMILY MEDICINE

## 2022-09-24 ENCOUNTER — HEALTH MAINTENANCE LETTER (OUTPATIENT)
Age: 42
End: 2022-09-24

## 2022-12-05 ENCOUNTER — LAB REQUISITION (OUTPATIENT)
Dept: LAB | Facility: CLINIC | Age: 42
End: 2022-12-05
Payer: COMMERCIAL

## 2022-12-05 DIAGNOSIS — E78.00 PURE HYPERCHOLESTEROLEMIA, UNSPECIFIED: ICD-10-CM

## 2022-12-05 LAB
CHOLEST SERPL-MCNC: 126 MG/DL
HDLC SERPL-MCNC: 58 MG/DL
LDLC SERPL CALC-MCNC: 52 MG/DL
NONHDLC SERPL-MCNC: 68 MG/DL
TRIGL SERPL-MCNC: 78 MG/DL

## 2022-12-05 PROCEDURE — 80061 LIPID PANEL: CPT | Mod: ORL | Performed by: FAMILY MEDICINE

## 2022-12-07 ENCOUNTER — TRANSCRIBE ORDERS (OUTPATIENT)
Dept: OTHER | Age: 42
End: 2022-12-07

## 2022-12-07 DIAGNOSIS — E11.9 TYPE 2 DIABETES MELLITUS WITHOUT COMPLICATION, WITH LONG-TERM CURRENT USE OF INSULIN (H): Primary | ICD-10-CM

## 2022-12-07 DIAGNOSIS — Z79.4 TYPE 2 DIABETES MELLITUS WITHOUT COMPLICATION, WITH LONG-TERM CURRENT USE OF INSULIN (H): Primary | ICD-10-CM

## 2023-01-29 ENCOUNTER — HEALTH MAINTENANCE LETTER (OUTPATIENT)
Age: 43
End: 2023-01-29

## 2023-03-06 ENCOUNTER — LAB REQUISITION (OUTPATIENT)
Dept: LAB | Facility: CLINIC | Age: 43
End: 2023-03-06
Payer: MEDICAID

## 2023-03-06 DIAGNOSIS — E11.9 TYPE 2 DIABETES MELLITUS WITHOUT COMPLICATIONS (H): ICD-10-CM

## 2023-03-06 DIAGNOSIS — Z79.4 LONG TERM (CURRENT) USE OF INSULIN (H): ICD-10-CM

## 2023-03-06 DIAGNOSIS — I10 ESSENTIAL (PRIMARY) HYPERTENSION: ICD-10-CM

## 2023-03-06 DIAGNOSIS — E66.9 OBESITY, UNSPECIFIED: ICD-10-CM

## 2023-03-06 LAB — HBA1C MFR BLD: 5.1 %

## 2023-03-06 PROCEDURE — 83036 HEMOGLOBIN GLYCOSYLATED A1C: CPT | Mod: ORL | Performed by: FAMILY MEDICINE

## 2023-08-05 ENCOUNTER — HEALTH MAINTENANCE LETTER (OUTPATIENT)
Age: 43
End: 2023-08-05

## 2023-12-23 ENCOUNTER — HEALTH MAINTENANCE LETTER (OUTPATIENT)
Age: 43
End: 2023-12-23

## 2024-03-02 ENCOUNTER — HEALTH MAINTENANCE LETTER (OUTPATIENT)
Age: 44
End: 2024-03-02

## 2024-05-05 ENCOUNTER — HEALTH MAINTENANCE LETTER (OUTPATIENT)
Age: 44
End: 2024-05-05

## 2024-09-22 ENCOUNTER — HEALTH MAINTENANCE LETTER (OUTPATIENT)
Age: 44
End: 2024-09-22

## 2025-01-12 ENCOUNTER — HEALTH MAINTENANCE LETTER (OUTPATIENT)
Age: 45
End: 2025-01-12

## 2025-03-02 SDOH — HEALTH STABILITY: PHYSICAL HEALTH: ON AVERAGE, HOW MANY DAYS PER WEEK DO YOU ENGAGE IN MODERATE TO STRENUOUS EXERCISE (LIKE A BRISK WALK)?: 5 DAYS

## 2025-03-02 SDOH — HEALTH STABILITY: PHYSICAL HEALTH: ON AVERAGE, HOW MANY MINUTES DO YOU ENGAGE IN EXERCISE AT THIS LEVEL?: 30 MIN

## 2025-03-02 ASSESSMENT — SOCIAL DETERMINANTS OF HEALTH (SDOH): HOW OFTEN DO YOU GET TOGETHER WITH FRIENDS OR RELATIVES?: ONCE A WEEK

## 2025-03-10 SDOH — HEALTH STABILITY: PHYSICAL HEALTH: ON AVERAGE, HOW MANY MINUTES DO YOU ENGAGE IN EXERCISE AT THIS LEVEL?: 30 MIN

## 2025-03-10 SDOH — HEALTH STABILITY: PHYSICAL HEALTH: ON AVERAGE, HOW MANY DAYS PER WEEK DO YOU ENGAGE IN MODERATE TO STRENUOUS EXERCISE (LIKE A BRISK WALK)?: 5 DAYS

## 2025-03-10 ASSESSMENT — SOCIAL DETERMINANTS OF HEALTH (SDOH): HOW OFTEN DO YOU GET TOGETHER WITH FRIENDS OR RELATIVES?: ONCE A WEEK

## 2025-03-11 ENCOUNTER — OFFICE VISIT (OUTPATIENT)
Dept: FAMILY MEDICINE | Facility: CLINIC | Age: 45
End: 2025-03-11
Payer: COMMERCIAL

## 2025-03-11 ENCOUNTER — ORDERS ONLY (AUTO-RELEASED) (OUTPATIENT)
Dept: FAMILY MEDICINE | Facility: CLINIC | Age: 45
End: 2025-03-11

## 2025-03-11 VITALS
RESPIRATION RATE: 18 BRPM | OXYGEN SATURATION: 98 % | HEART RATE: 99 BPM | HEIGHT: 75 IN | DIASTOLIC BLOOD PRESSURE: 98 MMHG | SYSTOLIC BLOOD PRESSURE: 158 MMHG | WEIGHT: 310.2 LBS | TEMPERATURE: 97.9 F | BODY MASS INDEX: 38.57 KG/M2

## 2025-03-11 DIAGNOSIS — Z12.11 SCREEN FOR COLON CANCER: ICD-10-CM

## 2025-03-11 DIAGNOSIS — Z13.1 SCREENING FOR DIABETES MELLITUS: ICD-10-CM

## 2025-03-11 DIAGNOSIS — I10 BENIGN ESSENTIAL HYPERTENSION: ICD-10-CM

## 2025-03-11 DIAGNOSIS — Z00.00 ROUTINE GENERAL MEDICAL EXAMINATION AT A HEALTH CARE FACILITY: Primary | ICD-10-CM

## 2025-03-11 DIAGNOSIS — Z11.3 SCREEN FOR STD (SEXUALLY TRANSMITTED DISEASE): ICD-10-CM

## 2025-03-11 DIAGNOSIS — Z13.220 SCREENING FOR HYPERLIPIDEMIA: ICD-10-CM

## 2025-03-11 LAB
EST. AVERAGE GLUCOSE BLD GHB EST-MCNC: 108 MG/DL
HBA1C MFR BLD: 5.4 % (ref 0–5.6)
T PALLIDUM AB SER QL: NONREACTIVE

## 2025-03-11 PROCEDURE — 1125F AMNT PAIN NOTED PAIN PRSNT: CPT

## 2025-03-11 PROCEDURE — 83036 HEMOGLOBIN GLYCOSYLATED A1C: CPT

## 2025-03-11 PROCEDURE — 87491 CHLMYD TRACH DNA AMP PROBE: CPT

## 2025-03-11 PROCEDURE — 36415 COLL VENOUS BLD VENIPUNCTURE: CPT

## 2025-03-11 PROCEDURE — 80061 LIPID PANEL: CPT

## 2025-03-11 PROCEDURE — 99386 PREV VISIT NEW AGE 40-64: CPT

## 2025-03-11 PROCEDURE — 3077F SYST BP >= 140 MM HG: CPT

## 2025-03-11 PROCEDURE — 3080F DIAST BP >= 90 MM HG: CPT

## 2025-03-11 PROCEDURE — 86780 TREPONEMA PALLIDUM: CPT

## 2025-03-11 PROCEDURE — 87591 N.GONORRHOEAE DNA AMP PROB: CPT

## 2025-03-11 PROCEDURE — 87389 HIV-1 AG W/HIV-1&-2 AB AG IA: CPT

## 2025-03-11 PROCEDURE — 99214 OFFICE O/P EST MOD 30 MIN: CPT | Mod: 25

## 2025-03-11 RX ORDER — LISINOPRIL 20 MG/1
20 TABLET ORAL DAILY
Qty: 60 TABLET | Refills: 0 | Status: SHIPPED | OUTPATIENT
Start: 2025-03-11

## 2025-03-11 ASSESSMENT — PAIN SCALES - GENERAL: PAINLEVEL_OUTOF10: MILD PAIN (3)

## 2025-03-11 NOTE — PATIENT INSTRUCTIONS
Patient Education   Preventive Care Advice   This is general advice given by our system to help you stay healthy. However, your care team may have specific advice just for you. Please talk to your care team about your preventive care needs.  Nutrition  Eat 5 or more servings of fruits and vegetables each day.  Try wheat bread, brown rice and whole grain pasta (instead of white bread, rice, and pasta).  Get enough calcium and vitamin D. Check the label on foods and aim for 100% of the RDA (recommended daily allowance).  Lifestyle  Exercise at least 150 minutes each week  (30 minutes a day, 5 days a week).  Do muscle strengthening activities 2 days a week. These help control your weight and prevent disease.  No smoking.  Wear sunscreen to prevent skin cancer.  Have a dental exam and cleaning every 6 months.  Yearly exams  See your health care team every year to talk about:  Any changes in your health.  Any medicines your care team has prescribed.  Preventive care, family planning, and ways to prevent chronic diseases.  Shots (vaccines)   HPV shots (up to age 26), if you've never had them before.  Hepatitis B shots (up to age 59), if you've never had them before.  COVID-19 shot: Get this shot when it's due.  Flu shot: Get a flu shot every year.  Tetanus shot: Get a tetanus shot every 10 years.  Pneumococcal, hepatitis A, and RSV shots: Ask your care team if you need these based on your risk.  Shingles shot (for age 50 and up)  General health tests  Diabetes screening:  Starting at age 35, Get screened for diabetes at least every 3 years.  If you are younger than age 35, ask your care team if you should be screened for diabetes.  Cholesterol test: At age 39, start having a cholesterol test every 5 years, or more often if advised.  Bone density scan (DEXA): At age 50, ask your care team if you should have this scan for osteoporosis (brittle bones).  Hepatitis C: Get tested at least once in your life.  STIs (sexually  transmitted infections)  Before age 24: Ask your care team if you should be screened for STIs.  After age 24: Get screened for STIs if you're at risk. You are at risk for STIs (including HIV) if:  You are sexually active with more than one person.  You don't use condoms every time.  You or a partner was diagnosed with a sexually transmitted infection.  If you are at risk for HIV, ask about PrEP medicine to prevent HIV.  Get tested for HIV at least once in your life, whether you are at risk for HIV or not.  Cancer screening tests  Cervical cancer screening: If you have a cervix, begin getting regular cervical cancer screening tests starting at age 21.  Breast cancer scan (mammogram): If you've ever had breasts, begin having regular mammograms starting at age 40. This is a scan to check for breast cancer.  Colon cancer screening: It is important to start screening for colon cancer at age 45.  Have a colonoscopy test every 10 years (or more often if you're at risk) Or, ask your provider about stool tests like a FIT test every year or Cologuard test every 3 years.  To learn more about your testing options, visit:   .  For help making a decision, visit:   https://bit.ly/lu21887.  Prostate cancer screening test: If you have a prostate, ask your care team if a prostate cancer screening test (PSA) at age 55 is right for you.  Lung cancer screening: If you are a current or former smoker ages 50 to 80, ask your care team if ongoing lung cancer screenings are right for you.  For informational purposes only. Not to replace the advice of your health care provider. Copyright   2023 [facility]. All rights reserved. Clinically reviewed by the [facility]. [employer] 543513 - REV 01/24.  Safer Sex: Care Instructions  Overview  Safer sex is a way to reduce your risk of getting a sexually transmitted infection (STI). It can also help prevent pregnancy.  Several products can help you practice  safer sex and reduce your chance of STIs. One of the best is a condom. There are internal and external condoms. You can use a special rubber sheet (dental dam) for protection during oral sex. Disposable gloves can keep your hands from touching blood, semen, or other body fluids that can carry infections.  Remember that birth control methods such as diaphragms, IUDs, foams, and birth control pills do not stop you from getting STIs.  Follow-up care is a key part of your treatment and safety. Be sure to make and go to all appointments, and call your doctor if you are having problems. It's also a good idea to know your test results and keep a list of the medicines you take.  How can you care for yourself at home?  Think about getting vaccinated to help prevent hepatitis A, hepatitis B, and human papillomavirus (HPV). They can be spread through sex.  Use a condom every time you have sex. Use an external condom, which goes on the penis. Or use an internal condom, which goes into the vagina or anus.  Make sure you use the right size external condom. A condom that's too small can break easily. A condom that's too big can slip off during sex.  Use a new condom each time you have sex. Be careful not to poke a hole in the condom when you open the wrapper.  Don't use an internal condom and an external condom at the same time.  Never use petroleum jelly (such as Vaseline), grease, hand lotion, baby oil, or anything with oil in it. These products can make holes in the condom.  After intercourse, hold the edge of the condom as you remove it. This will help keep semen from spilling out of the condom.  Do not have sex with anyone who has symptoms of an STI, such as sores on the genitals or mouth.  Do not drink a lot of alcohol or use drugs before sex.  Limit your sex partners. Sex with one partner who has sex only with you can reduce your risk of getting an STI.  Don't share sex toys. But if you do share them, use a condom and clean  "the sex toys between each use.  Talk to any partners before you have sex. Talk about what you feel comfortable with and whether you have any boundaries with sex. And find out if your partner or partners may be at risk for any STI. Keep in mind that a person may be able to spread an STI even if they do not have symptoms. You and any partners may want to get tested for STIs.  Where can you learn more?  Go to https://www.[employer].net/patiented  Enter B608 in the search box to learn more about \"Safer Sex: Care Instructions.\"  Current as of: April 30, 2024  Content Version: 14.3    2024 [employer].   Care instructions adapted under license by your healthcare professional. If you have questions about a medical condition or this instruction, always ask your healthcare professional. [employer] disclaims any warranty or liability for your use of this information.       "

## 2025-03-11 NOTE — PROGRESS NOTES
"Preventive Care Visit  [facility]  ANIYAH Voss CNP, Family Medicine  Mar 11, 2025      Assessment & Plan     Routine general medical examination at a health care facility  Reviewed age appropriate screening and immunizations. Screen for cholesterol and diabetes today. Declined Vaccines. Encouraged healthy lifestyle choices to reduce risk of cardiovascular disease. Return in 1 year for annual exam.       Benign essential hypertension  Chronic, uncontrolled, though has been off medications for at least a year or longer. Recommend to start back lisinopril 20mg, discussed with patient taking a half tablet for a few days to allow body to adjust, but then to resume full 20mg. Due for labs today. Will have him come back in 4-6 weeks for BP check with a MA/RN. May need to add in hydrochlorothiazide at that time.  - BASIC METABOLIC PANEL; Future  - lisinopril (ZESTRIL) 20 MG tablet; Take 1 tablet (20 mg) by mouth daily.  - BASIC METABOLIC PANEL    Screen for colon cancer  - COLOGUARD([employer]); Future    Screening for diabetes mellitus  - HEMOGLOBIN A1C; Future  - HEMOGLOBIN A1C    Screening for hyperlipidemia  - Lipid panel reflex to direct LDL Non-fasting; Future  - Lipid panel reflex to direct LDL Non-fasting    Screen for STD (sexually transmitted disease)  - Chlamydia trachomatis/Neisseria gonorrhoeae by PCR - Clinic Collect  - Treponema Abs w Reflex to RPR and Titer; Future  - HIV Antigen Antibody Combo; Future  - Treponema Abs w Reflex to RPR and Titer  - HIV Antigen Antibody Combo            BMI  Estimated body mass index is 39.29 kg/m  as calculated from the following:    Height as of this encounter: 1.892 m (6' 2.5\").    Weight as of this encounter: 140.7 kg (310 lb 3.2 oz).   Weight management plan: Discussed healthy diet and exercise guidelines    Counseling  Appropriate preventive services were addressed with this patient via screening, questionnaire, or discussion as " appropriate for fall prevention, nutrition, physical activity, Tobacco-use cessation, social engagement, weight loss and cognition.  Checklist reviewing preventive services available has been given to the patient.  Reviewed patient's diet, addressing concerns and/or questions.   The patient was instructed to see the dentist every 6 months.       Follow up in 4-6 weeks for  a BP check    Subjective   Karla is a 45 year old, presenting for the following:  Physical (No concerns )        3/11/2025     1:31 PM   Additional Questions   Roomed by Gia          HPI    Has been off all his medications. They ran out or - didn't think he needed them   Was previously on Lisinopril for HTN, last physical MD recommended adding in hydrochlorothiazide but he did not take this  Tolerated 20mg lisinopril     T2DM  -Was previously on medications for A1C that was 11 in , all previous A1C have been below 5, so was taken off all diabetes medications, thought to maybe be a fluke         Weight  - feels like he has been trying to eat healthy and exercising and isn't losing weight.   - Exercises 5 days a week for at least an hour          Advance Care Planning  Patient does not have a Health Care Directive: Discussed advance care planning with patient; however, patient declined at this time.      3/10/2025   General Health   How would you rate your overall physical health? (!) FAIR   Feel stress (tense, anxious, or unable to sleep) Not at all         3/10/2025   Nutrition   Three or more servings of calcium each day? Yes   Diet: I don't know   How many servings of fruit and vegetables per day? (!) 0-1   How many sweetened beverages each day? (!) 2         3/10/2025   Exercise   Days per week of moderate/strenous exercise 5 days   Average minutes spent exercising at this level 30 min         3/10/2025   Social Factors   Frequency of gathering with friends or relatives Once a week   Worry food won't last until get money to buy  more No   Food not last or not have enough money for food? No   Do you have housing? (Housing is defined as stable permanent housing and does not include staying ouside in a car, in a tent, in an abandoned building, in an overnight shelter, or couch-surfing.) Yes   Are you worried about losing your housing? Yes   Lack of transportation? No   Unable to get utilities (heat,electricity)? No   Want help with housing or utility concern? No   (!) HOUSING CONCERN PRESENT      3/10/2025   Dental   Dentist two times every year? (!) NO           Today's PHQ-2 Score:       3/10/2025     7:21 AM   PHQ-2 ( 1999 [employer])   Q1: Little interest or pleasure in doing things 0   Q2: Feeling down, depressed or hopeless 0   PHQ-2 Score 0    Q1: Little interest or pleasure in doing things Not at all   Q2: Feeling down, depressed or hopeless Not at all   PHQ-2 Score 0       Patient-reported           3/10/2025   Substance Use   Alcohol more than 3/day or more than 7/wk No   Do you use any other substances recreationally? No     Social History     Tobacco Use    Smoking status: Never    Smokeless tobacco: Never   Vaping Use    Vaping status: Never Used   Substance Use Topics    Alcohol use: Yes    Drug use: Never             3/10/2025   One time HIV Screening   Previous HIV test? Yes         3/10/2025   STI Screening   New sexual partner(s) since last STI/HIV test? (!) YES    ASCVD Risk   The ASCVD Risk score (Manjit KOHLER, et al., 2019) failed to calculate for the following reasons:    The valid total cholesterol range is 130 to 320 mg/dL        3/10/2025   Contraception/Family Planning   Questions about contraception or family planning No        Reviewed and updated as needed this visit by Provider                    Past Medical History:   Diagnosis Date    History of prediabetes     A1c of 5.9% July 2020.    Hypertension          Review of Systems  Constitutional, neuro, ENT, endocrine, pulmonary, cardiac, gastrointestinal,  "genitourinary, musculoskeletal, integument and psychiatric systems are negative, except as otherwise noted.     Objective    Exam  BP (!) 158/98 (BP Location: Left arm, Patient Position: Sitting, Cuff Size: Adult Large)   Pulse 99   Temp 97.9  F (36.6  C) (Oral)   Resp (!) 181   Ht 1.892 m (6' 2.5\")   Wt (!) 140.7 kg (310 lb 3.2 oz)   SpO2 98%   BMI 39.29 kg/m     Estimated body mass index is 39.29 kg/m  as calculated from the following:    Height as of this encounter: 1.892 m (6' 2.5\").    Weight as of this encounter: 140.7 kg (310 lb 3.2 oz).    Physical Exam  GENERAL: alert and no distress  EYES: Eyes grossly normal to inspection, PERRL and conjunctivae and sclerae normal  HENT: ear canals and TM's normal, and mouth without ulcers or lesions  NECK: no adenopathy, no asymmetry, masses, or scars  RESP: lungs clear to auscultation - no rales, rhonchi or wheezes  CV: regular rate and rhythm, normal S1 S2, no S3 or S4, no murmur, click or rub, no peripheral edema  ABDOMEN: soft, nontender, no hepatosplenomegaly, no masses and bowel sounds normal  MS: no gross musculoskeletal defects noted, no edema  SKIN: no suspicious lesions or rashes  NEURO: Normal strength and tone, mentation intact and speech normal  PSYCH: mentation appears normal, affect normal/bright        Signed Electronically by: ANIYAH Voss CNP    "

## 2025-03-12 LAB
ANION GAP SERPL CALCULATED.3IONS-SCNC: 12 MMOL/L (ref 7–15)
BUN SERPL-MCNC: 21 MG/DL (ref 6–20)
C TRACH DNA SPEC QL PROBE+SIG AMP: NEGATIVE
CALCIUM SERPL-MCNC: ABNORMAL MG/DL
CHLORIDE SERPL-SCNC: 107 MMOL/L (ref 98–107)
CHOLEST SERPL-MCNC: 177 MG/DL
CREAT SERPL-MCNC: 1.14 MG/DL (ref 0.67–1.17)
EGFRCR SERPLBLD CKD-EPI 2021: 80 ML/MIN/1.73M2
FASTING STATUS PATIENT QL REPORTED: NO
FASTING STATUS PATIENT QL REPORTED: NO
GLUCOSE SERPL-MCNC: 93 MG/DL (ref 70–99)
HCO3 SERPL-SCNC: 24 MMOL/L (ref 22–29)
HDLC SERPL-MCNC: 49 MG/DL
HIV 1+2 AB+HIV1 P24 AG SERPL QL IA: NONREACTIVE
LDLC SERPL CALC-MCNC: 95 MG/DL
N GONORRHOEA DNA SPEC QL NAA+PROBE: NEGATIVE
NONHDLC SERPL-MCNC: 128 MG/DL
POTASSIUM SERPL-SCNC: 4.1 MMOL/L (ref 3.4–5.3)
SODIUM SERPL-SCNC: 143 MMOL/L (ref 135–145)
SPECIMEN TYPE: NORMAL
TRIGL SERPL-MCNC: 163 MG/DL

## 2025-03-15 LAB
ANION GAP SERPL CALCULATED.3IONS-SCNC: 12 MMOL/L (ref 7–15)
BUN SERPL-MCNC: 21 MG/DL (ref 6–20)
CALCIUM SERPL-MCNC: 9.9 MG/DL (ref 8.8–10.4)
CHLORIDE SERPL-SCNC: 107 MMOL/L (ref 98–107)
CREAT SERPL-MCNC: 1.14 MG/DL (ref 0.67–1.17)
EGFRCR SERPLBLD CKD-EPI 2021: 80 ML/MIN/1.73M2
FASTING STATUS PATIENT QL REPORTED: NO
GLUCOSE SERPL-MCNC: 93 MG/DL (ref 70–99)
HCO3 SERPL-SCNC: 24 MMOL/L (ref 22–29)
POTASSIUM SERPL-SCNC: 4.1 MMOL/L (ref 3.4–5.3)
SODIUM SERPL-SCNC: 143 MMOL/L (ref 135–145)

## 2025-04-12 LAB — NONINV COLON CA DNA+OCC BLD SCRN STL QL: NEGATIVE

## 2025-06-28 ENCOUNTER — HEALTH MAINTENANCE LETTER (OUTPATIENT)
Age: 45
End: 2025-06-28